# Patient Record
Sex: FEMALE | URBAN - METROPOLITAN AREA
[De-identification: names, ages, dates, MRNs, and addresses within clinical notes are randomized per-mention and may not be internally consistent; named-entity substitution may affect disease eponyms.]

---

## 2020-12-22 ENCOUNTER — OUTPATIENT (OUTPATIENT)
Dept: OUTPATIENT SERVICES | Facility: HOSPITAL | Age: 20
LOS: 1 days | End: 2020-12-22

## 2020-12-22 ENCOUNTER — TRANSCRIPTION ENCOUNTER (OUTPATIENT)
Age: 20
End: 2020-12-22

## 2020-12-22 VITALS
OXYGEN SATURATION: 98 % | HEART RATE: 95 BPM | DIASTOLIC BLOOD PRESSURE: 68 MMHG | SYSTOLIC BLOOD PRESSURE: 102 MMHG | HEIGHT: 60 IN | RESPIRATION RATE: 16 BRPM | WEIGHT: 132.94 LBS | TEMPERATURE: 99 F

## 2020-12-22 DIAGNOSIS — Z41.1 ENCOUNTER FOR COSMETIC SURGERY: ICD-10-CM

## 2020-12-22 DIAGNOSIS — K08.409 PARTIAL LOSS OF TEETH, UNSPECIFIED CAUSE, UNSPECIFIED CLASS: Chronic | ICD-10-CM

## 2020-12-22 RX ORDER — SODIUM CHLORIDE 9 MG/ML
3 INJECTION INTRAMUSCULAR; INTRAVENOUS; SUBCUTANEOUS EVERY 8 HOURS
Refills: 0 | Status: DISCONTINUED | OUTPATIENT
Start: 2020-12-29 | End: 2021-01-12

## 2020-12-22 RX ORDER — SODIUM CHLORIDE 9 MG/ML
1000 INJECTION, SOLUTION INTRAVENOUS
Refills: 0 | Status: DISCONTINUED | OUTPATIENT
Start: 2020-12-29 | End: 2021-01-12

## 2020-12-22 NOTE — H&P PST ADULT - HISTORY OF PRESENT ILLNESS
19 y/o female presents to PST preop for bilateral breast reduction on 12/29/20. preop dx of encounter for cosmetic surgery. pt reports upper back and bilateral shoulder pain as a result of breast size.

## 2020-12-22 NOTE — H&P PST ADULT - NSICDXPROBLEM_GEN_ALL_CORE_FT
PROBLEM DIAGNOSES  Problem: Encounter for cosmetic surgery  Assessment and Plan: preop for bilateral breast reduction on 12/29/20  preop instructions given, pt verbalized understanding  GI prophylaxis and chlorhexidine wash provided  pt is scheduled for COVID testing preop

## 2020-12-22 NOTE — H&P PST ADULT - NSANTHOSAYNRD_GEN_A_CORE
No. CRISTINA screening performed.  STOP BANG Legend: 0-2 = LOW Risk; 3-4 = INTERMEDIATE Risk; 5-8 = HIGH Risk

## 2020-12-26 ENCOUNTER — APPOINTMENT (OUTPATIENT)
Dept: DISASTER EMERGENCY | Facility: CLINIC | Age: 20
End: 2020-12-26

## 2020-12-26 DIAGNOSIS — Z01.818 ENCOUNTER FOR OTHER PREPROCEDURAL EXAMINATION: ICD-10-CM

## 2020-12-26 PROBLEM — Z00.00 ENCOUNTER FOR PREVENTIVE HEALTH EXAMINATION: Status: ACTIVE | Noted: 2020-12-26

## 2020-12-26 LAB — SARS-COV-2 N GENE NPH QL NAA+PROBE: NOT DETECTED

## 2020-12-29 ENCOUNTER — OUTPATIENT (OUTPATIENT)
Dept: OUTPATIENT SERVICES | Facility: HOSPITAL | Age: 20
LOS: 1 days | Discharge: HOME | End: 2020-12-29
Payer: SELF-PAY

## 2020-12-29 ENCOUNTER — RESULT REVIEW (OUTPATIENT)
Age: 20
End: 2020-12-29

## 2020-12-29 VITALS
OXYGEN SATURATION: 98 % | TEMPERATURE: 98 F | RESPIRATION RATE: 16 BRPM | HEART RATE: 110 BPM | DIASTOLIC BLOOD PRESSURE: 55 MMHG | SYSTOLIC BLOOD PRESSURE: 98 MMHG

## 2020-12-29 VITALS
SYSTOLIC BLOOD PRESSURE: 126 MMHG | OXYGEN SATURATION: 96 % | HEIGHT: 60 IN | HEART RATE: 117 BPM | RESPIRATION RATE: 16 BRPM | TEMPERATURE: 99 F | WEIGHT: 132.94 LBS | DIASTOLIC BLOOD PRESSURE: 74 MMHG

## 2020-12-29 DIAGNOSIS — Z41.1 ENCOUNTER FOR COSMETIC SURGERY: ICD-10-CM

## 2020-12-29 DIAGNOSIS — K08.409 PARTIAL LOSS OF TEETH, UNSPECIFIED CAUSE, UNSPECIFIED CLASS: Chronic | ICD-10-CM

## 2020-12-29 LAB — HCG UR QL: NEGATIVE — SIGNIFICANT CHANGE UP

## 2020-12-29 PROCEDURE — 88305 TISSUE EXAM BY PATHOLOGIST: CPT | Mod: 26

## 2020-12-29 PROCEDURE — 19318 BREAST REDUCTION: CPT | Mod: RT,LT

## 2020-12-29 RX ORDER — HYDROMORPHONE HYDROCHLORIDE 2 MG/ML
0.5 INJECTION INTRAMUSCULAR; INTRAVENOUS; SUBCUTANEOUS
Refills: 0 | Status: DISCONTINUED | OUTPATIENT
Start: 2020-12-29 | End: 2020-12-29

## 2020-12-29 RX ORDER — HYDROMORPHONE HYDROCHLORIDE 2 MG/ML
1 INJECTION INTRAMUSCULAR; INTRAVENOUS; SUBCUTANEOUS ONCE
Refills: 0 | Status: DISCONTINUED | OUTPATIENT
Start: 2020-12-29 | End: 2020-12-29

## 2020-12-29 RX ORDER — HYDROCODONE BITARTRATE AND ACETAMINOPHEN 7.5; 325 MG/15ML; MG/15ML
1 SOLUTION ORAL
Qty: 30 | Refills: 0
Start: 2020-12-29 | End: 2021-01-02

## 2020-12-29 RX ORDER — SODIUM CHLORIDE 9 MG/ML
3 INJECTION INTRAMUSCULAR; INTRAVENOUS; SUBCUTANEOUS EVERY 8 HOURS
Refills: 0 | Status: DISCONTINUED | OUTPATIENT
Start: 2020-12-29 | End: 2021-01-12

## 2020-12-29 RX ORDER — ONDANSETRON 8 MG/1
4 TABLET, FILM COATED ORAL ONCE
Refills: 0 | Status: DISCONTINUED | OUTPATIENT
Start: 2020-12-29 | End: 2021-01-12

## 2020-12-29 RX ADMIN — HYDROMORPHONE HYDROCHLORIDE 0.5 MILLIGRAM(S): 2 INJECTION INTRAMUSCULAR; INTRAVENOUS; SUBCUTANEOUS at 20:25

## 2020-12-29 RX ADMIN — HYDROMORPHONE HYDROCHLORIDE 0.5 MILLIGRAM(S): 2 INJECTION INTRAMUSCULAR; INTRAVENOUS; SUBCUTANEOUS at 20:10

## 2020-12-29 NOTE — ASU DISCHARGE PLAN (ADULT/PEDIATRIC) - FOLLOW UP APPOINTMENTS
911 or go to the nearest Emergency Room may also call Recovery Room (PACU) 24/7 @ [phone]/[facility] ASU (Adult):

## 2020-12-29 NOTE — ASU DISCHARGE PLAN (ADULT/PEDIATRIC) - CARE PROVIDER_API CALL
Zohaib Garcia  PLASTIC SURGERY  [address] [place] [address], [facility] [address]  [place] [address]  Phone: [phone]  Fax: [phone]  Follow Up Time:

## 2020-12-29 NOTE — ASU DISCHARGE PLAN (ADULT/PEDIATRIC) - CALL YOUR DOCTOR IF YOU HAVE ANY OF THE FOLLOWING:
Bleeding that does not stop/Swelling that gets worse/Pain not relieved by Medications/Fever greater than (need to indicate Fahrenheit or Celsius)/Wound/Surgical Site with redness, or foul smelling discharge or pus/Numbness, tingling, color or temperature change to extremity/Nausea and vomiting that does not stop Bleeding that does not stop/Swelling that gets worse/Pain not relieved by Medications/Fever greater than (need to indicate Fahrenheit or Celsius)/Wound/Surgical Site with redness, or foul smelling discharge or pus/Numbness, tingling, color or temperature change to extremity/Nausea and vomiting that does not stop/Unable to urinate

## 2020-12-29 NOTE — ASU DISCHARGE PLAN (ADULT/PEDIATRIC) - ASU DC SPECIAL INSTRUCTIONSFT
Dr. Zohaib Garcia Discharge instructions     1.) No heavy lifting    2.) Please take prescriptions from the office as directed     3.) Sponge bathe only , DO NOT GET BRA WET    4.) Keep bra on at all times, do not remove     5.) Follow up with Dr. Garcia next week

## 2020-12-29 NOTE — ASU DISCHARGE PLAN (ADULT/PEDIATRIC) - NURSING INSTRUCTIONS
Please report any signs and symptoms of infection including Fever (Temp >101 or >100.4 if GYN procedure), uncontrollable nausea, vomiting, diarrhea, chills & inability to urinate. Please report any puss or increased drainage from incision sites, or if redness develops and spreads around sites. Please practice good hand hygiene especially after using the bathroom. Follow up with all MD appointments and take medication(s) as prescribed   DO NOT take any Tylenol (Acetaminophen) or narcotics containing Tylenol until after 12:30AM on 12/30 . You received Tylenol during your operation and it can cause damage to your liver if too much is taken within a 24 hour time period.

## 2021-01-04 LAB — SURGICAL PATHOLOGY STUDY: SIGNIFICANT CHANGE UP

## 2023-11-14 RX ORDER — NITROFURANTOIN MACROCRYSTAL 50 MG
1 CAPSULE ORAL
Refills: 0 | DISCHARGE
Start: 2023-11-14 | End: 2023-11-20

## 2023-12-18 ENCOUNTER — TRANSCRIPTION ENCOUNTER (OUTPATIENT)
Age: 23
End: 2023-12-18

## 2023-12-18 ENCOUNTER — INPATIENT (INPATIENT)
Facility: HOSPITAL | Age: 23
LOS: 0 days | Discharge: HOME | DRG: 660 | End: 2023-12-19
Attending: INTERNAL MEDICINE | Admitting: STUDENT IN AN ORGANIZED HEALTH CARE EDUCATION/TRAINING PROGRAM
Payer: COMMERCIAL

## 2023-12-18 VITALS
SYSTOLIC BLOOD PRESSURE: 107 MMHG | HEART RATE: 89 BPM | HEIGHT: 67 IN | DIASTOLIC BLOOD PRESSURE: 64 MMHG | OXYGEN SATURATION: 100 % | WEIGHT: 119.93 LBS | TEMPERATURE: 98 F | RESPIRATION RATE: 18 BRPM

## 2023-12-18 DIAGNOSIS — K08.409 PARTIAL LOSS OF TEETH, UNSPECIFIED CAUSE, UNSPECIFIED CLASS: Chronic | ICD-10-CM

## 2023-12-18 DIAGNOSIS — N13.2 HYDRONEPHROSIS WITH RENAL AND URETERAL CALCULOUS OBSTRUCTION: ICD-10-CM

## 2023-12-18 DIAGNOSIS — N23 UNSPECIFIED RENAL COLIC: ICD-10-CM

## 2023-12-18 DIAGNOSIS — K51.00 ULCERATIVE (CHRONIC) PANCOLITIS WITHOUT COMPLICATIONS: ICD-10-CM

## 2023-12-18 PROBLEM — F41.9 ANXIETY DISORDER, UNSPECIFIED: Chronic | Status: ACTIVE | Noted: 2020-12-22

## 2023-12-18 PROBLEM — Z41.1 ENCOUNTER FOR COSMETIC SURGERY: Chronic | Status: ACTIVE | Noted: 2020-12-22

## 2023-12-18 LAB
ABO RH CONFIRMATION: SIGNIFICANT CHANGE UP
ABO RH CONFIRMATION: SIGNIFICANT CHANGE UP
ALBUMIN SERPL ELPH-MCNC: 4 G/DL — SIGNIFICANT CHANGE UP (ref 3.3–5)
ALBUMIN SERPL ELPH-MCNC: 4 G/DL — SIGNIFICANT CHANGE UP (ref 3.3–5)
ALP SERPL-CCNC: 92 U/L — SIGNIFICANT CHANGE UP (ref 40–120)
ALP SERPL-CCNC: 92 U/L — SIGNIFICANT CHANGE UP (ref 40–120)
ALT FLD-CCNC: 20 U/L — SIGNIFICANT CHANGE UP (ref 12–78)
ALT FLD-CCNC: 20 U/L — SIGNIFICANT CHANGE UP (ref 12–78)
ANION GAP SERPL CALC-SCNC: 10 MMOL/L — SIGNIFICANT CHANGE UP (ref 5–17)
ANION GAP SERPL CALC-SCNC: 10 MMOL/L — SIGNIFICANT CHANGE UP (ref 5–17)
ANION GAP SERPL CALC-SCNC: 14 MMOL/L — SIGNIFICANT CHANGE UP (ref 5–17)
ANION GAP SERPL CALC-SCNC: 14 MMOL/L — SIGNIFICANT CHANGE UP (ref 5–17)
ANION GAP SERPL CALC-SCNC: 4 MMOL/L — LOW (ref 5–17)
ANION GAP SERPL CALC-SCNC: 4 MMOL/L — LOW (ref 5–17)
APPEARANCE UR: ABNORMAL
APPEARANCE UR: ABNORMAL
AST SERPL-CCNC: 16 U/L — SIGNIFICANT CHANGE UP (ref 15–37)
AST SERPL-CCNC: 16 U/L — SIGNIFICANT CHANGE UP (ref 15–37)
BACTERIA # UR AUTO: ABNORMAL /HPF
BACTERIA # UR AUTO: ABNORMAL /HPF
BASOPHILS # BLD AUTO: 0.06 K/UL — SIGNIFICANT CHANGE UP (ref 0–0.2)
BASOPHILS # BLD AUTO: 0.06 K/UL — SIGNIFICANT CHANGE UP (ref 0–0.2)
BASOPHILS NFR BLD AUTO: 0.2 % — SIGNIFICANT CHANGE UP (ref 0–2)
BASOPHILS NFR BLD AUTO: 0.2 % — SIGNIFICANT CHANGE UP (ref 0–2)
BILIRUB SERPL-MCNC: 0.5 MG/DL — SIGNIFICANT CHANGE UP (ref 0.2–1.2)
BILIRUB SERPL-MCNC: 0.5 MG/DL — SIGNIFICANT CHANGE UP (ref 0.2–1.2)
BILIRUB UR-MCNC: NEGATIVE — SIGNIFICANT CHANGE UP
BILIRUB UR-MCNC: NEGATIVE — SIGNIFICANT CHANGE UP
BLD GP AB SCN SERPL QL: SIGNIFICANT CHANGE UP
BLD GP AB SCN SERPL QL: SIGNIFICANT CHANGE UP
BUN SERPL-MCNC: 12 MG/DL — SIGNIFICANT CHANGE UP (ref 7–23)
BUN SERPL-MCNC: 12 MG/DL — SIGNIFICANT CHANGE UP (ref 7–23)
BUN SERPL-MCNC: 15 MG/DL — SIGNIFICANT CHANGE UP (ref 7–23)
BUN SERPL-MCNC: 15 MG/DL — SIGNIFICANT CHANGE UP (ref 7–23)
BUN SERPL-MCNC: 9 MG/DL — SIGNIFICANT CHANGE UP (ref 7–23)
BUN SERPL-MCNC: 9 MG/DL — SIGNIFICANT CHANGE UP (ref 7–23)
CALCIUM SERPL-MCNC: 7.7 MG/DL — LOW (ref 8.5–10.1)
CALCIUM SERPL-MCNC: 7.7 MG/DL — LOW (ref 8.5–10.1)
CALCIUM SERPL-MCNC: 8.3 MG/DL — LOW (ref 8.5–10.1)
CALCIUM SERPL-MCNC: 8.3 MG/DL — LOW (ref 8.5–10.1)
CALCIUM SERPL-MCNC: 9.7 MG/DL — SIGNIFICANT CHANGE UP (ref 8.5–10.1)
CALCIUM SERPL-MCNC: 9.7 MG/DL — SIGNIFICANT CHANGE UP (ref 8.5–10.1)
CAST: 5 /LPF — HIGH (ref 0–4)
CAST: 5 /LPF — HIGH (ref 0–4)
CHLORIDE SERPL-SCNC: 107 MMOL/L — SIGNIFICANT CHANGE UP (ref 96–108)
CHLORIDE SERPL-SCNC: 107 MMOL/L — SIGNIFICANT CHANGE UP (ref 96–108)
CHLORIDE SERPL-SCNC: 113 MMOL/L — HIGH (ref 96–108)
CHLORIDE SERPL-SCNC: 113 MMOL/L — HIGH (ref 96–108)
CHLORIDE SERPL-SCNC: 114 MMOL/L — HIGH (ref 96–108)
CHLORIDE SERPL-SCNC: 114 MMOL/L — HIGH (ref 96–108)
CO2 SERPL-SCNC: 18 MMOL/L — LOW (ref 22–31)
CO2 SERPL-SCNC: 18 MMOL/L — LOW (ref 22–31)
CO2 SERPL-SCNC: 19 MMOL/L — LOW (ref 22–31)
CO2 SERPL-SCNC: 19 MMOL/L — LOW (ref 22–31)
CO2 SERPL-SCNC: 22 MMOL/L — SIGNIFICANT CHANGE UP (ref 22–31)
CO2 SERPL-SCNC: 22 MMOL/L — SIGNIFICANT CHANGE UP (ref 22–31)
COLOR SPEC: YELLOW — SIGNIFICANT CHANGE UP
COLOR SPEC: YELLOW — SIGNIFICANT CHANGE UP
CREAT SERPL-MCNC: 0.8 MG/DL — SIGNIFICANT CHANGE UP (ref 0.5–1.3)
CREAT SERPL-MCNC: 0.8 MG/DL — SIGNIFICANT CHANGE UP (ref 0.5–1.3)
CREAT SERPL-MCNC: 0.87 MG/DL — SIGNIFICANT CHANGE UP (ref 0.5–1.3)
CREAT SERPL-MCNC: 0.87 MG/DL — SIGNIFICANT CHANGE UP (ref 0.5–1.3)
CREAT SERPL-MCNC: 0.92 MG/DL — SIGNIFICANT CHANGE UP (ref 0.5–1.3)
CREAT SERPL-MCNC: 0.92 MG/DL — SIGNIFICANT CHANGE UP (ref 0.5–1.3)
D DIMER BLD IA.RAPID-MCNC: <150 NG/ML DDU — SIGNIFICANT CHANGE UP
D DIMER BLD IA.RAPID-MCNC: <150 NG/ML DDU — SIGNIFICANT CHANGE UP
DIFF PNL FLD: ABNORMAL
DIFF PNL FLD: ABNORMAL
EGFR: 106 ML/MIN/1.73M2 — SIGNIFICANT CHANGE UP
EGFR: 106 ML/MIN/1.73M2 — SIGNIFICANT CHANGE UP
EGFR: 90 ML/MIN/1.73M2 — SIGNIFICANT CHANGE UP
EGFR: 90 ML/MIN/1.73M2 — SIGNIFICANT CHANGE UP
EGFR: 96 ML/MIN/1.73M2 — SIGNIFICANT CHANGE UP
EGFR: 96 ML/MIN/1.73M2 — SIGNIFICANT CHANGE UP
EOSINOPHIL # BLD AUTO: 0.02 K/UL — SIGNIFICANT CHANGE UP (ref 0–0.5)
EOSINOPHIL # BLD AUTO: 0.02 K/UL — SIGNIFICANT CHANGE UP (ref 0–0.5)
EOSINOPHIL NFR BLD AUTO: 0.1 % — SIGNIFICANT CHANGE UP (ref 0–6)
EOSINOPHIL NFR BLD AUTO: 0.1 % — SIGNIFICANT CHANGE UP (ref 0–6)
GLUCOSE SERPL-MCNC: 122 MG/DL — HIGH (ref 70–99)
GLUCOSE SERPL-MCNC: 122 MG/DL — HIGH (ref 70–99)
GLUCOSE SERPL-MCNC: 169 MG/DL — HIGH (ref 70–99)
GLUCOSE SERPL-MCNC: 169 MG/DL — HIGH (ref 70–99)
GLUCOSE SERPL-MCNC: 98 MG/DL — SIGNIFICANT CHANGE UP (ref 70–99)
GLUCOSE SERPL-MCNC: 98 MG/DL — SIGNIFICANT CHANGE UP (ref 70–99)
GLUCOSE UR QL: NEGATIVE MG/DL — SIGNIFICANT CHANGE UP
GLUCOSE UR QL: NEGATIVE MG/DL — SIGNIFICANT CHANGE UP
HCG SERPL-ACNC: <1 MIU/ML — SIGNIFICANT CHANGE UP
HCG SERPL-ACNC: <1 MIU/ML — SIGNIFICANT CHANGE UP
HCT VFR BLD CALC: 37.4 % — SIGNIFICANT CHANGE UP (ref 34.5–45)
HCT VFR BLD CALC: 37.4 % — SIGNIFICANT CHANGE UP (ref 34.5–45)
HGB BLD-MCNC: 13.1 G/DL — SIGNIFICANT CHANGE UP (ref 11.5–15.5)
HGB BLD-MCNC: 13.1 G/DL — SIGNIFICANT CHANGE UP (ref 11.5–15.5)
IMM GRANULOCYTES NFR BLD AUTO: 0.6 % — SIGNIFICANT CHANGE UP (ref 0–0.9)
IMM GRANULOCYTES NFR BLD AUTO: 0.6 % — SIGNIFICANT CHANGE UP (ref 0–0.9)
KETONES UR-MCNC: 80 MG/DL
KETONES UR-MCNC: 80 MG/DL
LACTATE SERPL-SCNC: 1 MMOL/L — SIGNIFICANT CHANGE UP (ref 0.7–2)
LACTATE SERPL-SCNC: 1 MMOL/L — SIGNIFICANT CHANGE UP (ref 0.7–2)
LACTATE SERPL-SCNC: 2.7 MMOL/L — HIGH (ref 0.7–2)
LACTATE SERPL-SCNC: 2.7 MMOL/L — HIGH (ref 0.7–2)
LACTATE SERPL-SCNC: 3 MMOL/L — HIGH (ref 0.7–2)
LACTATE SERPL-SCNC: 3 MMOL/L — HIGH (ref 0.7–2)
LACTATE SERPL-SCNC: 5.2 MMOL/L — CRITICAL HIGH (ref 0.7–2)
LACTATE SERPL-SCNC: 5.2 MMOL/L — CRITICAL HIGH (ref 0.7–2)
LEUKOCYTE ESTERASE UR-ACNC: ABNORMAL
LEUKOCYTE ESTERASE UR-ACNC: ABNORMAL
LIDOCAIN IGE QN: 23 U/L — SIGNIFICANT CHANGE UP (ref 13–75)
LIDOCAIN IGE QN: 23 U/L — SIGNIFICANT CHANGE UP (ref 13–75)
LYMPHOCYTES # BLD AUTO: 10.3 % — LOW (ref 13–44)
LYMPHOCYTES # BLD AUTO: 10.3 % — LOW (ref 13–44)
LYMPHOCYTES # BLD AUTO: 2.54 K/UL — SIGNIFICANT CHANGE UP (ref 1–3.3)
LYMPHOCYTES # BLD AUTO: 2.54 K/UL — SIGNIFICANT CHANGE UP (ref 1–3.3)
MANUAL SMEAR VERIFICATION: SIGNIFICANT CHANGE UP
MANUAL SMEAR VERIFICATION: SIGNIFICANT CHANGE UP
MCHC RBC-ENTMCNC: 30.8 PG — SIGNIFICANT CHANGE UP (ref 27–34)
MCHC RBC-ENTMCNC: 30.8 PG — SIGNIFICANT CHANGE UP (ref 27–34)
MCHC RBC-ENTMCNC: 35 GM/DL — SIGNIFICANT CHANGE UP (ref 32–36)
MCHC RBC-ENTMCNC: 35 GM/DL — SIGNIFICANT CHANGE UP (ref 32–36)
MCV RBC AUTO: 88 FL — SIGNIFICANT CHANGE UP (ref 80–100)
MCV RBC AUTO: 88 FL — SIGNIFICANT CHANGE UP (ref 80–100)
MONOCYTES # BLD AUTO: 1.18 K/UL — HIGH (ref 0–0.9)
MONOCYTES # BLD AUTO: 1.18 K/UL — HIGH (ref 0–0.9)
MONOCYTES NFR BLD AUTO: 4.8 % — SIGNIFICANT CHANGE UP (ref 2–14)
MONOCYTES NFR BLD AUTO: 4.8 % — SIGNIFICANT CHANGE UP (ref 2–14)
NEUTROPHILS # BLD AUTO: 20.74 K/UL — HIGH (ref 1.8–7.4)
NEUTROPHILS # BLD AUTO: 20.74 K/UL — HIGH (ref 1.8–7.4)
NEUTROPHILS NFR BLD AUTO: 84 % — HIGH (ref 43–77)
NEUTROPHILS NFR BLD AUTO: 84 % — HIGH (ref 43–77)
NITRITE UR-MCNC: NEGATIVE — SIGNIFICANT CHANGE UP
NITRITE UR-MCNC: NEGATIVE — SIGNIFICANT CHANGE UP
PH UR: 5.5 — SIGNIFICANT CHANGE UP (ref 5–8)
PH UR: 5.5 — SIGNIFICANT CHANGE UP (ref 5–8)
PLAT MORPH BLD: NORMAL — SIGNIFICANT CHANGE UP
PLAT MORPH BLD: NORMAL — SIGNIFICANT CHANGE UP
PLATELET # BLD AUTO: 345 K/UL — SIGNIFICANT CHANGE UP (ref 150–400)
PLATELET # BLD AUTO: 345 K/UL — SIGNIFICANT CHANGE UP (ref 150–400)
POTASSIUM SERPL-MCNC: 3 MMOL/L — LOW (ref 3.5–5.3)
POTASSIUM SERPL-MCNC: 3 MMOL/L — LOW (ref 3.5–5.3)
POTASSIUM SERPL-MCNC: 3.6 MMOL/L — SIGNIFICANT CHANGE UP (ref 3.5–5.3)
POTASSIUM SERPL-MCNC: 3.6 MMOL/L — SIGNIFICANT CHANGE UP (ref 3.5–5.3)
POTASSIUM SERPL-MCNC: 4 MMOL/L — SIGNIFICANT CHANGE UP (ref 3.5–5.3)
POTASSIUM SERPL-MCNC: 4 MMOL/L — SIGNIFICANT CHANGE UP (ref 3.5–5.3)
POTASSIUM SERPL-SCNC: 3 MMOL/L — LOW (ref 3.5–5.3)
POTASSIUM SERPL-SCNC: 3 MMOL/L — LOW (ref 3.5–5.3)
POTASSIUM SERPL-SCNC: 3.6 MMOL/L — SIGNIFICANT CHANGE UP (ref 3.5–5.3)
POTASSIUM SERPL-SCNC: 3.6 MMOL/L — SIGNIFICANT CHANGE UP (ref 3.5–5.3)
POTASSIUM SERPL-SCNC: 4 MMOL/L — SIGNIFICANT CHANGE UP (ref 3.5–5.3)
POTASSIUM SERPL-SCNC: 4 MMOL/L — SIGNIFICANT CHANGE UP (ref 3.5–5.3)
PROT SERPL-MCNC: 7.9 GM/DL — SIGNIFICANT CHANGE UP (ref 6–8.3)
PROT SERPL-MCNC: 7.9 GM/DL — SIGNIFICANT CHANGE UP (ref 6–8.3)
PROT UR-MCNC: SIGNIFICANT CHANGE UP MG/DL
PROT UR-MCNC: SIGNIFICANT CHANGE UP MG/DL
RBC # BLD: 4.25 M/UL — SIGNIFICANT CHANGE UP (ref 3.8–5.2)
RBC # BLD: 4.25 M/UL — SIGNIFICANT CHANGE UP (ref 3.8–5.2)
RBC # FLD: 12.2 % — SIGNIFICANT CHANGE UP (ref 10.3–14.5)
RBC # FLD: 12.2 % — SIGNIFICANT CHANGE UP (ref 10.3–14.5)
RBC BLD AUTO: NORMAL — SIGNIFICANT CHANGE UP
RBC BLD AUTO: NORMAL — SIGNIFICANT CHANGE UP
RBC CASTS # UR COMP ASSIST: 482 /HPF — HIGH (ref 0–4)
RBC CASTS # UR COMP ASSIST: 482 /HPF — HIGH (ref 0–4)
SODIUM SERPL-SCNC: 139 MMOL/L — SIGNIFICANT CHANGE UP (ref 135–145)
SODIUM SERPL-SCNC: 139 MMOL/L — SIGNIFICANT CHANGE UP (ref 135–145)
SODIUM SERPL-SCNC: 140 MMOL/L — SIGNIFICANT CHANGE UP (ref 135–145)
SODIUM SERPL-SCNC: 140 MMOL/L — SIGNIFICANT CHANGE UP (ref 135–145)
SODIUM SERPL-SCNC: 142 MMOL/L — SIGNIFICANT CHANGE UP (ref 135–145)
SODIUM SERPL-SCNC: 142 MMOL/L — SIGNIFICANT CHANGE UP (ref 135–145)
SP GR SPEC: 1.03 — SIGNIFICANT CHANGE UP (ref 1–1.03)
SP GR SPEC: 1.03 — SIGNIFICANT CHANGE UP (ref 1–1.03)
SQUAMOUS # UR AUTO: 6 /HPF — HIGH (ref 0–5)
SQUAMOUS # UR AUTO: 6 /HPF — HIGH (ref 0–5)
TROPONIN I, HIGH SENSITIVITY RESULT: <3 NG/L — SIGNIFICANT CHANGE UP
TROPONIN I, HIGH SENSITIVITY RESULT: <3 NG/L — SIGNIFICANT CHANGE UP
UROBILINOGEN FLD QL: 1 MG/DL — SIGNIFICANT CHANGE UP (ref 0.2–1)
UROBILINOGEN FLD QL: 1 MG/DL — SIGNIFICANT CHANGE UP (ref 0.2–1)
WBC # BLD: 24.7 K/UL — HIGH (ref 3.8–10.5)
WBC # BLD: 24.7 K/UL — HIGH (ref 3.8–10.5)
WBC # FLD AUTO: 24.7 K/UL — HIGH (ref 3.8–10.5)
WBC # FLD AUTO: 24.7 K/UL — HIGH (ref 3.8–10.5)
WBC UR QL: 8 /HPF — HIGH (ref 0–5)
WBC UR QL: 8 /HPF — HIGH (ref 0–5)

## 2023-12-18 PROCEDURE — C2617: CPT

## 2023-12-18 PROCEDURE — 99222 1ST HOSP IP/OBS MODERATE 55: CPT | Mod: 25

## 2023-12-18 PROCEDURE — 36415 COLL VENOUS BLD VENIPUNCTURE: CPT

## 2023-12-18 PROCEDURE — 85027 COMPLETE CBC AUTOMATED: CPT

## 2023-12-18 PROCEDURE — 83605 ASSAY OF LACTIC ACID: CPT

## 2023-12-18 PROCEDURE — 80048 BASIC METABOLIC PNL TOTAL CA: CPT

## 2023-12-18 PROCEDURE — 71275 CT ANGIOGRAPHY CHEST: CPT | Mod: 26,MD

## 2023-12-18 PROCEDURE — 87040 BLOOD CULTURE FOR BACTERIA: CPT

## 2023-12-18 PROCEDURE — C1769: CPT

## 2023-12-18 PROCEDURE — 76000 FLUOROSCOPY <1 HR PHYS/QHP: CPT

## 2023-12-18 PROCEDURE — 52332 CYSTOSCOPY AND TREATMENT: CPT | Mod: LT

## 2023-12-18 PROCEDURE — 99285 EMERGENCY DEPT VISIT HI MDM: CPT

## 2023-12-18 PROCEDURE — 74177 CT ABD & PELVIS W/CONTRAST: CPT | Mod: 26,MD

## 2023-12-18 PROCEDURE — 99223 1ST HOSP IP/OBS HIGH 75: CPT

## 2023-12-18 RX ORDER — ONDANSETRON 8 MG/1
4 TABLET, FILM COATED ORAL ONCE
Refills: 0 | Status: DISCONTINUED | OUTPATIENT
Start: 2023-12-18 | End: 2023-12-18

## 2023-12-18 RX ORDER — PIPERACILLIN AND TAZOBACTAM 4; .5 G/20ML; G/20ML
3.38 INJECTION, POWDER, LYOPHILIZED, FOR SOLUTION INTRAVENOUS ONCE
Refills: 0 | Status: COMPLETED | OUTPATIENT
Start: 2023-12-18 | End: 2023-12-18

## 2023-12-18 RX ORDER — SODIUM CHLORIDE 9 MG/ML
1000 INJECTION INTRAMUSCULAR; INTRAVENOUS; SUBCUTANEOUS ONCE
Refills: 0 | Status: COMPLETED | OUTPATIENT
Start: 2023-12-18 | End: 2023-12-18

## 2023-12-18 RX ORDER — OXYCODONE AND ACETAMINOPHEN 5; 325 MG/1; MG/1
1 TABLET ORAL EVERY 4 HOURS
Refills: 0 | Status: DISCONTINUED | OUTPATIENT
Start: 2023-12-18 | End: 2023-12-19

## 2023-12-18 RX ORDER — ONDANSETRON 8 MG/1
4 TABLET, FILM COATED ORAL ONCE
Refills: 0 | Status: COMPLETED | OUTPATIENT
Start: 2023-12-18 | End: 2023-12-18

## 2023-12-18 RX ORDER — SODIUM CHLORIDE 9 MG/ML
1700 INJECTION, SOLUTION INTRAVENOUS ONCE
Refills: 0 | Status: DISCONTINUED | OUTPATIENT
Start: 2023-12-18 | End: 2023-12-19

## 2023-12-18 RX ORDER — MORPHINE SULFATE 50 MG/1
2 CAPSULE, EXTENDED RELEASE ORAL ONCE
Refills: 0 | Status: DISCONTINUED | OUTPATIENT
Start: 2023-12-18 | End: 2023-12-18

## 2023-12-18 RX ORDER — PHENAZOPYRIDINE HCL 100 MG
100 TABLET ORAL EVERY 8 HOURS
Refills: 0 | Status: DISCONTINUED | OUTPATIENT
Start: 2023-12-18 | End: 2023-12-19

## 2023-12-18 RX ORDER — FENTANYL CITRATE 50 UG/ML
50 INJECTION INTRAVENOUS
Refills: 0 | Status: DISCONTINUED | OUTPATIENT
Start: 2023-12-18 | End: 2023-12-18

## 2023-12-18 RX ORDER — FENTANYL CITRATE 50 UG/ML
25 INJECTION INTRAVENOUS
Refills: 0 | Status: DISCONTINUED | OUTPATIENT
Start: 2023-12-18 | End: 2023-12-18

## 2023-12-18 RX ORDER — PIPERACILLIN AND TAZOBACTAM 4; .5 G/20ML; G/20ML
3.38 INJECTION, POWDER, LYOPHILIZED, FOR SOLUTION INTRAVENOUS EVERY 8 HOURS
Refills: 0 | Status: DISCONTINUED | OUTPATIENT
Start: 2023-12-18 | End: 2023-12-19

## 2023-12-18 RX ORDER — METRONIDAZOLE 500 MG
500 TABLET ORAL ONCE
Refills: 0 | Status: DISCONTINUED | OUTPATIENT
Start: 2023-12-18 | End: 2023-12-18

## 2023-12-18 RX ORDER — HYDROMORPHONE HYDROCHLORIDE 2 MG/ML
1 INJECTION INTRAMUSCULAR; INTRAVENOUS; SUBCUTANEOUS EVERY 4 HOURS
Refills: 0 | Status: DISCONTINUED | OUTPATIENT
Start: 2023-12-18 | End: 2023-12-19

## 2023-12-18 RX ORDER — CEFTRIAXONE 500 MG/1
1000 INJECTION, POWDER, FOR SOLUTION INTRAMUSCULAR; INTRAVENOUS ONCE
Refills: 0 | Status: DISCONTINUED | OUTPATIENT
Start: 2023-12-18 | End: 2023-12-18

## 2023-12-18 RX ORDER — CEFTRIAXONE 500 MG/1
2000 INJECTION, POWDER, FOR SOLUTION INTRAMUSCULAR; INTRAVENOUS EVERY 24 HOURS
Refills: 0 | Status: DISCONTINUED | OUTPATIENT
Start: 2023-12-18 | End: 2023-12-18

## 2023-12-18 RX ORDER — POTASSIUM CHLORIDE 20 MEQ
10 PACKET (EA) ORAL ONCE
Refills: 0 | Status: DISCONTINUED | OUTPATIENT
Start: 2023-12-18 | End: 2023-12-18

## 2023-12-18 RX ORDER — KETOROLAC TROMETHAMINE 30 MG/ML
15 SYRINGE (ML) INJECTION ONCE
Refills: 0 | Status: DISCONTINUED | OUTPATIENT
Start: 2023-12-18 | End: 2023-12-18

## 2023-12-18 RX ORDER — CEFTRIAXONE 500 MG/1
1000 INJECTION, POWDER, FOR SOLUTION INTRAMUSCULAR; INTRAVENOUS EVERY 24 HOURS
Refills: 0 | Status: DISCONTINUED | OUTPATIENT
Start: 2023-12-18 | End: 2023-12-18

## 2023-12-18 RX ORDER — ONDANSETRON 8 MG/1
4 TABLET, FILM COATED ORAL EVERY 8 HOURS
Refills: 0 | Status: DISCONTINUED | OUTPATIENT
Start: 2023-12-18 | End: 2023-12-19

## 2023-12-18 RX ORDER — PHENAZOPYRIDINE HCL 100 MG
200 TABLET ORAL ONCE
Refills: 0 | Status: COMPLETED | OUTPATIENT
Start: 2023-12-18 | End: 2023-12-18

## 2023-12-18 RX ORDER — CEFTRIAXONE 500 MG/1
1000 INJECTION, POWDER, FOR SOLUTION INTRAMUSCULAR; INTRAVENOUS ONCE
Refills: 0 | Status: COMPLETED | OUTPATIENT
Start: 2023-12-18 | End: 2023-12-18

## 2023-12-18 RX ORDER — POTASSIUM CHLORIDE 20 MEQ
10 PACKET (EA) ORAL ONCE
Refills: 0 | Status: COMPLETED | OUTPATIENT
Start: 2023-12-18 | End: 2023-12-18

## 2023-12-18 RX ORDER — SODIUM CHLORIDE 9 MG/ML
1000 INJECTION INTRAMUSCULAR; INTRAVENOUS; SUBCUTANEOUS
Refills: 0 | Status: DISCONTINUED | OUTPATIENT
Start: 2023-12-18 | End: 2023-12-19

## 2023-12-18 RX ORDER — LANOLIN ALCOHOL/MO/W.PET/CERES
3 CREAM (GRAM) TOPICAL AT BEDTIME
Refills: 0 | Status: DISCONTINUED | OUTPATIENT
Start: 2023-12-18 | End: 2023-12-19

## 2023-12-18 RX ORDER — OXYBUTYNIN CHLORIDE 5 MG
5 TABLET ORAL THREE TIMES A DAY
Refills: 0 | Status: DISCONTINUED | OUTPATIENT
Start: 2023-12-18 | End: 2023-12-19

## 2023-12-18 RX ORDER — ACETAMINOPHEN 500 MG
1000 TABLET ORAL ONCE
Refills: 0 | Status: COMPLETED | OUTPATIENT
Start: 2023-12-18 | End: 2023-12-18

## 2023-12-18 RX ORDER — SODIUM CHLORIDE 9 MG/ML
1000 INJECTION, SOLUTION INTRAVENOUS
Refills: 0 | Status: DISCONTINUED | OUTPATIENT
Start: 2023-12-18 | End: 2023-12-18

## 2023-12-18 RX ORDER — TAMSULOSIN HYDROCHLORIDE 0.4 MG/1
0.4 CAPSULE ORAL AT BEDTIME
Refills: 0 | Status: DISCONTINUED | OUTPATIENT
Start: 2023-12-18 | End: 2023-12-19

## 2023-12-18 RX ADMIN — Medication 400 MILLIGRAM(S): at 08:59

## 2023-12-18 RX ADMIN — SODIUM CHLORIDE 100 MILLILITER(S): 9 INJECTION INTRAMUSCULAR; INTRAVENOUS; SUBCUTANEOUS at 14:15

## 2023-12-18 RX ADMIN — TAMSULOSIN HYDROCHLORIDE 0.4 MILLIGRAM(S): 0.4 CAPSULE ORAL at 23:12

## 2023-12-18 RX ADMIN — SODIUM CHLORIDE 100 MILLILITER(S): 9 INJECTION INTRAMUSCULAR; INTRAVENOUS; SUBCUTANEOUS at 08:11

## 2023-12-18 RX ADMIN — MORPHINE SULFATE 2 MILLIGRAM(S): 50 CAPSULE, EXTENDED RELEASE ORAL at 05:17

## 2023-12-18 RX ADMIN — MORPHINE SULFATE 2 MILLIGRAM(S): 50 CAPSULE, EXTENDED RELEASE ORAL at 06:09

## 2023-12-18 RX ADMIN — PIPERACILLIN AND TAZOBACTAM 200 GRAM(S): 4; .5 INJECTION, POWDER, LYOPHILIZED, FOR SOLUTION INTRAVENOUS at 08:11

## 2023-12-18 RX ADMIN — SODIUM CHLORIDE 1000 MILLILITER(S): 9 INJECTION INTRAMUSCULAR; INTRAVENOUS; SUBCUTANEOUS at 05:17

## 2023-12-18 RX ADMIN — TAMSULOSIN HYDROCHLORIDE 0.4 MILLIGRAM(S): 0.4 CAPSULE ORAL at 10:49

## 2023-12-18 RX ADMIN — PIPERACILLIN AND TAZOBACTAM 25 GRAM(S): 4; .5 INJECTION, POWDER, LYOPHILIZED, FOR SOLUTION INTRAVENOUS at 14:14

## 2023-12-18 RX ADMIN — ONDANSETRON 4 MILLIGRAM(S): 8 TABLET, FILM COATED ORAL at 05:17

## 2023-12-18 RX ADMIN — MORPHINE SULFATE 2 MILLIGRAM(S): 50 CAPSULE, EXTENDED RELEASE ORAL at 08:11

## 2023-12-18 RX ADMIN — ONDANSETRON 4 MILLIGRAM(S): 8 TABLET, FILM COATED ORAL at 08:59

## 2023-12-18 RX ADMIN — Medication 200 MILLIGRAM(S): at 13:09

## 2023-12-18 RX ADMIN — Medication 100 MILLIGRAM(S): at 23:11

## 2023-12-18 RX ADMIN — PIPERACILLIN AND TAZOBACTAM 25 GRAM(S): 4; .5 INJECTION, POWDER, LYOPHILIZED, FOR SOLUTION INTRAVENOUS at 23:12

## 2023-12-18 RX ADMIN — SODIUM CHLORIDE 1000 MILLILITER(S): 9 INJECTION INTRAMUSCULAR; INTRAVENOUS; SUBCUTANEOUS at 07:06

## 2023-12-18 RX ADMIN — Medication 15 MILLIGRAM(S): at 07:02

## 2023-12-18 RX ADMIN — Medication 100 MILLIEQUIVALENT(S): at 07:00

## 2023-12-18 NOTE — H&P ADULT - NSHPLABSRESULTS_GEN_ALL_CORE
Lactate 5.2                          13.1   24.70 )-----------( 345      ( 18 Dec 2023 05:10 )             37.4   12-18    140  |  107  |  15  ----------------------------<  169<H>  3.0<L>   |  19<L>  |  0.92    Ca    9.7      18 Dec 2023 05:10    TPro  7.9  /  Alb  4.0  /  TBili  0.5  /  DBili  x   /  AST  16  /  ALT  20  /  AlkPhos  92  12-18      < from: CT Angio Chest PE Protocol w/ IV Cont (12.18.23 @ 05:38) >    IMPRESSION:    Pancolitis.    3 mm calculus in the distal left ureter associated mild left-sided   hydronephrosis. Delayed left-sided nephrogram.

## 2023-12-18 NOTE — PATIENT PROFILE ADULT - PRO INTERPRETER NEED 2
post-op plan: WBAT, PT/OT, ancef per protocol, BLE SCDs, ASA x 4 weeks post-op, f/u ortho 3 weeks English

## 2023-12-18 NOTE — CONSULT NOTE ADULT - NS ATTEND AMEND GEN_ALL_CORE FT
Patient was seen and examined by me, agree with above note, where necessary edits were made.     No prior personal h/o kidney stone. Father has h/o kidney stone.   Discussed treatment options and recommended Cystoscopy and left ureteral stent placement considering abnormal labs.   Pt and father agreeable.   Risks and benefits were discussed. Informed consent obtained.   Discussed will need definitive management of ureteral stone at later date. Discussed treatment options: ESWL Vs Ureteroscopy.   Risks and benefits of each were discussed. Recommended Ureteroscopy.   Patient agreeable. Will schedule out patient.

## 2023-12-18 NOTE — BRIEF OPERATIVE NOTE - NSICDXBRIEFPREOP_GEN_ALL_CORE_FT
PRE-OP DIAGNOSIS:  Renal colic 18-Dec-2023 13:04:43  Kilo Denton  Ureteral stone with hydronephrosis 18-Dec-2023 13:04:51  Kilo Denton

## 2023-12-18 NOTE — ED ADULT NURSE REASSESSMENT NOTE - NS ED NURSE REASSESS COMMENT FT1
pt had 1 line with potasium running. Multiple attempts for second line unsuccessful. As per previous rn md ybarra aware, waiting for ultra sound iv.. 2nd set of blood cultures and lactate sent. pt is 10/10 pain. dad at bedside.

## 2023-12-18 NOTE — ED PROVIDER NOTE - PROGRESS NOTE DETAILS
Connie MIN: Patient with CT evidence of pancolitis, kidney stone, possible UTI, high concern for infected stone, unable to tolerate PO, s/p abx, urology to follow up inpatient, spoke with Dr. Virgil Soriano, hospitalist admission appreciated. MS, BALTA.

## 2023-12-18 NOTE — ED PROVIDER NOTE - CONSTITUTIONAL, MLM
Well appearing, awake, alert, oriented to person, place, time/situation and in no apparent distress. normal... Well appearing, awake, alert, oriented to person, place, time/situation and in no apparent distress. AAOx4.

## 2023-12-18 NOTE — H&P ADULT - NSHPPHYSICALEXAM_GEN_ALL_CORE
Vital Signs Last 24 Hrs  T(C): 37.3 (18 Dec 2023 07:20), Max: 37.3 (18 Dec 2023 07:20)  T(F): 99.2 (18 Dec 2023 07:20), Max: 99.2 (18 Dec 2023 07:20)  HR: 85 (18 Dec 2023 07:20) (85 - 89)  BP: 123/81 (18 Dec 2023 07:20) (107/64 - 123/81)  BP(mean): 92 (18 Dec 2023 07:20) (92 - 92)  RR: 18 (18 Dec 2023 07:20) (18 - 18)  SpO2: 100% (18 Dec 2023 07:20) (100% - 100%)    Parameters below as of 18 Dec 2023 07:20  Patient On (Oxygen Delivery Method): room air    PHYSICAL EXAM:      Constitutional: NAD  Eyes: perrl, no conjunctival changes  ENMT: no exudates, moist oral muc, uvula midline  Neck: no JVD, no LAD  Back: no cva tenderness  Respiratory: CTA, no exp wheezes  Cardiovascular: S1S2 reg, no murmur gallop or rub  Gastrointestinal: abd soft, NT/ND + BS  Genitourinary: voiding  Extremities: FROM, no joint effusions, no edema, no clubbing , no cyanosis  Vascular: pedal pulses + bilateral, warm extremities  Neurological: non focal, mot str 5/5/ all extr  Skin: no rashes  Lymph Nodes: no LAD

## 2023-12-18 NOTE — ED PROVIDER NOTE - CLINICAL SUMMARY MEDICAL DECISION MAKING FREE TEXT BOX
abdominal pain, flank pain, failure to tolerate PO, ct, labs, r/o pyelo, stone, appy, colitis. etc. Patient with CT evidence of pancolitis, kidney stone, possible UTI, high concern for infected stone, unable to tolerate PO, s/p abx, urology to follow up inpatient, spoke with Dr. Virgil Soriano, hospitalist admission appreciated. MS, BALTA.

## 2023-12-18 NOTE — ED ADULT NURSE NOTE - NSFALLRISKINTERV_ED_ALL_ED
Assistance OOB with selected safe patient handling equipment if applicable/Assistance with ambulation/Communicate fall risk and risk factors to all staff, patient, and family/Monitor gait and stability/Provide visual cue: yellow wristband, yellow gown, etc/Reinforce activity limits and safety measures with patient and family/Call bell, personal items and telephone in reach/Instruct patient to call for assistance before getting out of bed/chair/stretcher/Non-slip footwear applied when patient is off stretcher/[place] to call system/Physically safe environment - no spills, clutter or unnecessary equipment/Purposeful Proactive Rounding/Room/bathroom lighting operational, light cord in reach Assistance OOB with selected safe patient handling equipment if applicable/Assistance with ambulation/Communicate fall risk and risk factors to all staff, patient, and family/Monitor gait and stability/Provide visual cue: yellow wristband, yellow gown, etc/Reinforce activity limits and safety measures with patient and family/Call bell, personal items and telephone in reach/Instruct patient to call for assistance before getting out of bed/chair/stretcher/Non-slip footwear applied when patient is off stretcher/[place] to call system/Physically safe environment - no spills, clutter or unnecessary equipment/Purposeful Proactive Rounding/Room/bathroom lighting operational, light cord in reach

## 2023-12-18 NOTE — PHARMACOTHERAPY INTERVENTION NOTE - COMMENTS
Medication reconciliation complete, [facility]  Medication reconciliation complete, [facility]

## 2023-12-18 NOTE — BRIEF OPERATIVE NOTE - NSICDXBRIEFPOSTOP_GEN_ALL_CORE_FT
POST-OP DIAGNOSIS:  Renal colic 18-Dec-2023 13:04:57  Kilo Denton  Ureteral stone with hydronephrosis 18-Dec-2023 13:05:00  Kilo Denton

## 2023-12-18 NOTE — H&P ADULT - ASSESSMENT
* Flank pain sec to obstructive uropathy and sepsis  NPO  repeat lactate  c/w IVF  Zosyn ( to cover urine and pancolitis)  case d/w Dad and Urology  close monitoring for worsening signs of sepsis    * Pancolitis  no diarrhea I will not assume cdiff  zosyn

## 2023-12-18 NOTE — ED PROVIDER NOTE - CARE PLAN
Principal Discharge DX:	Pancolitis  Secondary Diagnosis:	Kidney stone  Secondary Diagnosis:	Acute UTI   1

## 2023-12-18 NOTE — ED ADULT NURSE NOTE - OBJECTIVE STATEMENT
pt presents to ED c/o n/v/d and abd pain x3 hours PTA. pt denies pmhx. pt denies cp, sob, ha, dizziness. pt has not eaten since prior nights dinner and states she did not have anything out of the ordinary. pt is a&o x3, with no other complaints. dad at bedside.

## 2023-12-18 NOTE — ED PROVIDER NOTE - OBJECTIVE STATEMENT
23 year old female with PMH of anxiety, family hx of Crohn's dx presents with cc of progressively worsening flank pain, nausea, vomiting, failure to tolerate PO and diffuse abdominal pain. No fever or chills. No melena or hematochezia. Has dysuria and frequency. Pt/father suspect UTI. Patient is ambulatory, tolerating PO. No recent trauma. No visual or focal neurological complaints. Describes pain as sharp pleuritic, 10/10 in left flank and lower upper thoracic cavity.

## 2023-12-18 NOTE — H&P ADULT - HISTORY OF PRESENT ILLNESS
23 year old female with PMH of anxiety, family hx of Crohn's dx presents with cc of progressively worsening flank pain, nausea, vomiting, failure to tolerate PO and diffuse abdominal pain. No fever or chills. No melena or hematochezia. Has dysuria and frequency.    CT Angio Chest PE Protocol w/ IV Cont (12.18.23 @ 05:38) >  IMPRESSION:    Pancolitis.    3 mm calculus in the distal left ureter associated mild left-sided   hydronephrosis. Delayed left-sided nephrogram.  Adm NPO for stent placement. LActate at 0530 was 5.2 no repeat lactate one ordered   23 year old female with PMH of anxiety, family hx of Crohn's dx presents with cc of progressively worsening flank pain, nausea, vomiting, failure to tolerate PO and diffuse abdominal pain. No fever or chills. No melena or hematochezia. Has dysuria and frequency.    CT Angio Chest PE Protocol w/ IV Cont (12.18.23 @ 05:38) >  IMPRESSION:    Pancolitis.    3 mm calculus in the distal left ureter associated mild left-sided   hydronephrosis. Delayed left-sided nephrogram.  Adm NPO for stent placement. Lactate at 0530 was 5.2 no repeat lactate; one ordered

## 2023-12-18 NOTE — ED PROVIDER NOTE - DIFFERENTIAL DIAGNOSIS
Differential Diagnosis abdominal pain, flank pain, failure to tolerate PO, ct, labs, r/o pyelo, stone, appy, colitis. etc. Patient with CT evidence of pancolitis, kidney stone, possible UTI, high concern for infected stone, unable to tolerate PO, s/p abx, urology to follow up inpatient, spoke with Dr. Virgil Soriano, hospitalist admission appreciated. MS, BALTA.

## 2023-12-18 NOTE — CONSULT NOTE ADULT - SUBJECTIVE AND OBJECTIVE BOX
HPI:  23 year old female with PMH of anxiety, family hx of Crohn's dx presents with cc of progressively worsening flank pain, nausea, vomiting, failure to tolerate PO and diffuse abdominal pain. No fever or chills. No melena or hematochezia. Has dysuria and frequency.    CT Angio Chest PE Protocol w/ IV Cont (12.18.23 @ 05:38) >  IMPRESSION:    Pancolitis.    3 mm calculus in the distal left ureter associated mild left-sided   hydronephrosis. Delayed left-sided nephrogram.  Adm NPO for stent placement. Lactate at 0530 was 5.2 no repeat lactate; one ordered   (18 Dec 2023 08:04)    24 yo female with PMH as above admitted with left flank pain found to have obstructing 3 mm left ureteral stone with WBC of 24 and Lactate of 5. Pt seen at bedside with family at bedside. Patient reports she developed sudden left flank pain at 2 am intermittent 10/10 at it's worst with nausea, NBNB vomiting and diarrhea. Patient denies any fevers, chills, dysuria, hematuria, urinary frequency or urgency.   PAST MEDICAL & SURGICAL HISTORY:  Encounter for cosmetic surgery      Anxiety      H/O wisdom tooth extraction  july 2020          REVIEW OF SYSTEMS  All other review of systems neg, except as noted in HPI    MEDICATIONS  (STANDING):  lactated ringers Bolus 1700 milliLiter(s) IV Bolus once  piperacillin/tazobactam IVPB.- 3.375 Gram(s) IV Intermittent once  piperacillin/tazobactam IVPB.. 3.375 Gram(s) IV Intermittent every 8 hours  sodium chloride 0.9%. 1000 milliLiter(s) (100 mL/Hr) IV Continuous <Continuous>  tamsulosin 0.4 milliGRAM(s) Oral at bedtime    MEDICATIONS  (PRN):  aluminum hydroxide/magnesium hydroxide/simethicone Suspension 30 milliLiter(s) Oral every 4 hours PRN Dyspepsia  HYDROmorphone  Injectable 1 milliGRAM(s) IV Push every 4 hours PRN Severe Pain (7 - 10)  melatonin 3 milliGRAM(s) Oral at bedtime PRN Insomnia  ondansetron Injectable 4 milliGRAM(s) IV Push every 8 hours PRN Nausea and/or Vomiting  oxycodone    5 mG/acetaminophen 325 mG 1 Tablet(s) Oral every 4 hours PRN Moderate Pain (4 - 6)      Allergies    No Known Allergies    Intolerances        SOCIAL HISTORY:    FAMILY HISTORY:  FH: diabetes mellitus  grandfahter        Vital Signs Last 24 Hrs  T(C): 37 (18 Dec 2023 08:15), Max: 37.3 (18 Dec 2023 07:20)  T(F): 98.6 (18 Dec 2023 08:15), Max: 99.2 (18 Dec 2023 07:20)  HR: 110 (18 Dec 2023 08:15) (85 - 110)  BP: 119/74 (18 Dec 2023 08:15) (107/64 - 123/81)  BP(mean): 89 (18 Dec 2023 08:15) (89 - 92)  RR: 16 (18 Dec 2023 08:15) (16 - 18)  SpO2: 100% (18 Dec 2023 08:15) (100% - 100%)    Parameters below as of 18 Dec 2023 08:15  Patient On (Oxygen Delivery Method): room air        PHYSICAL EXAM:    General: No distress, No anxiety  VITALS  T(C): 37 (12-18-23 @ 08:15), Max: 37.3 (12-18-23 @ 07:20)  HR: 110 (12-18-23 @ 08:15) (85 - 110)  BP: 119/74 (12-18-23 @ 08:15) (107/64 - 123/81)  RR: 16 (12-18-23 @ 08:15) (16 - 18)  SpO2: 100% (12-18-23 @ 08:15) (100% - 100%)             HEENT: Normocephalic, no icterus , EOM full , No epistaxis  Lung    : No resp distress  Abdo:   : Soft, Non tender, No guarding, No distension   Back    : No CVAT on the right, Left flank tenderness to palpation  Genitalia Female: No De La Cruz  Neuro   : A&Ox3        LABS:                        13.1   24.70 )-----------( 345      ( 18 Dec 2023 05:10 )             37.4     12-18    142  |  114<H>  |  12  ----------------------------<  122<H>  3.6   |  18<L>  |  0.87    Ca    7.7<L>      18 Dec 2023 08:49    TPro  7.9  /  Alb  4.0  /  TBili  0.5  /  DBili  x   /  AST  16  /  ALT  20  /  AlkPhos  92  12-18      Urinalysis Basic - ( 18 Dec 2023 08:49 )    Color: x / Appearance: x / SG: x / pH: x  Gluc: 122 mg/dL / Ketone: x  / Bili: x / Urobili: x   Blood: x / Protein: x / Nitrite: x   Leuk Esterase: x / RBC: x / WBC x   Sq Epi: x / Non Sq Epi: x / Bacteria: x        RADIOLOGY & ADDITIONAL STUDIES:< from: CT Abdomen and Pelvis w/ IV Cont (12.18.23 @ 05:38) >    ACC: [id number] EXAM:  CT ABDOMEN AND PELVIS IC   ORDERED BY: RUSTY MENDES     ACC: [id number] EXAM:  CT ANGIO CHEST PULM ART WAWIC   ORDERED BY: RUSTY MENDES     PROCEDURE DATE:  12/18/2023          INTERPRETATION:  CLINICAL INFORMATION: flankpain, abdominal pain    TECHNIQUE: CT imaging of the chest, abdomen, and pelvis was obtained with   IV contrast. 90 cc of Omnipaque 350 was administered. MIP images were   also obtained.    COMPARISON: There are no prior studies available for comparison.    FINDINGS:    Chest:    The thyroid is normal.    There is no supraclavicular, axillary, mediastinal or hilar   lymphadenopathy.    The heart is normal in size. There is no pericardial effusion.    There are no pulmonary nodules or focal consolidations. No pleural   effusions are present. The tracheobronchial tree is patent.    Abdomen/Pelvis:    The liver, spleen, pancreas, gallbladder and biliary tree are   unremarkable.    3 mm calculus in the distal left ureter associated mild left-sided   hydronephrosis. Delayed left-sided nephrogram. Punctate nonobstructing   left-sided intrarenal calculus. There is no right-sided hydronephrosis.   The adrenal glands are unremarkable.    Wall thickening of the ascending, transverse, descending andsigmoid   colon consistent with a pancolitis. There is no bowel obstruction. The   appendix is normal. Trace free fluid in the pelvis.    The urinary bladder is not well-distended, limiting evaluation.    There is no abdominal or pelvic lymphadenopathy.    The aorta and IVC are unremarkable.    The visualized osseous structures are within normal limits.    IMPRESSION:    Pancolitis.    3 mm calculus in the distal left ureter associated mild left-sided   hydronephrosis. Delayed left-sided nephrogram.    --- End of Report ---            MAIA YAN MD; Attending Radiologist  This document has been electronically signed. Dec 18 2023  6:05AM    < end of copied text >   HPI:  23 year old female with PMH of anxiety, family hx of Crohn's dx presents with cc of progressively worsening flank pain, nausea, vomiting, failure to tolerate PO and diffuse abdominal pain. No fever or chills. No melena or hematochezia. Has dysuria and frequency.    CT Angio Chest PE Protocol w/ IV Cont (12.18.23 @ 05:38) >  IMPRESSION:    Pancolitis.    3 mm calculus in the distal left ureter associated mild left-sided   hydronephrosis. Delayed left-sided nephrogram.  Adm NPO for stent placement. Lactate at 0530 was 5.2 no repeat lactate; one ordered   (18 Dec 2023 08:04)    24 yo female with PMH as above admitted with left flank pain found to have obstructing 3 mm left ureteral stone with WBC of 24 and Lactate of 5. Pt seen at bedside with family at bedside. Patient reports she developed sudden left flank pain at 2 am intermittent 10/10 at it's worst with nausea, NBNB vomiting and diarrhea. Patient denies any fevers, chills, dysuria, hematuria, urinary frequency or urgency.   PAST MEDICAL & SURGICAL HISTORY:  Encounter for cosmetic surgery      Anxiety      H/O wisdom tooth extraction  july 2020          REVIEW OF SYSTEMS  All other review of systems neg, except as noted in HPI    MEDICATIONS  (STANDING):  lactated ringers Bolus 1700 milliLiter(s) IV Bolus once  piperacillin/tazobactam IVPB.- 3.375 Gram(s) IV Intermittent once  piperacillin/tazobactam IVPB.. 3.375 Gram(s) IV Intermittent every 8 hours  sodium chloride 0.9%. 1000 milliLiter(s) (100 mL/Hr) IV Continuous <Continuous>  tamsulosin 0.4 milliGRAM(s) Oral at bedtime    MEDICATIONS  (PRN):  aluminum hydroxide/magnesium hydroxide/simethicone Suspension 30 milliLiter(s) Oral every 4 hours PRN Dyspepsia  HYDROmorphone  Injectable 1 milliGRAM(s) IV Push every 4 hours PRN Severe Pain (7 - 10)  melatonin 3 milliGRAM(s) Oral at bedtime PRN Insomnia  ondansetron Injectable 4 milliGRAM(s) IV Push every 8 hours PRN Nausea and/or Vomiting  oxycodone    5 mG/acetaminophen 325 mG 1 Tablet(s) Oral every 4 hours PRN Moderate Pain (4 - 6)      Allergies    No Known Allergies    Intolerances        SOCIAL HISTORY:    FAMILY HISTORY:  FH: diabetes mellitus  grandfahter        Vital Signs Last 24 Hrs  T(C): 37 (18 Dec 2023 08:15), Max: 37.3 (18 Dec 2023 07:20)  T(F): 98.6 (18 Dec 2023 08:15), Max: 99.2 (18 Dec 2023 07:20)  HR: 110 (18 Dec 2023 08:15) (85 - 110)  BP: 119/74 (18 Dec 2023 08:15) (107/64 - 123/81)  BP(mean): 89 (18 Dec 2023 08:15) (89 - 92)  RR: 16 (18 Dec 2023 08:15) (16 - 18)  SpO2: 100% (18 Dec 2023 08:15) (100% - 100%)    Parameters below as of 18 Dec 2023 08:15  Patient On (Oxygen Delivery Method): room air        PHYSICAL EXAM:    General: No distress, No anxiety  VITALS  T(C): 37 (12-18-23 @ 08:15), Max: 37.3 (12-18-23 @ 07:20)  HR: 110 (12-18-23 @ 08:15) (85 - 110)  BP: 119/74 (12-18-23 @ 08:15) (107/64 - 123/81)  RR: 16 (12-18-23 @ 08:15) (16 - 18)  SpO2: 100% (12-18-23 @ 08:15) (100% - 100%)             HEENT: Normocephalic, no icterus , EOM full , No epistaxis  Lung    : No resp distress  Abdo:   : Soft, Non tender, No guarding, No distension   Back    : No CVAT on the right, Left flank tenderness to palpation  Genitalia Female: No De La Cruz  Neuro   : A&Ox3        LABS:                        13.1   24.70 )-----------( 345      ( 18 Dec 2023 05:10 )             37.4     12-18    142  |  114<H>  |  12  ----------------------------<  122<H>  3.6   |  18<L>  |  0.87    Ca    7.7<L>      18 Dec 2023 08:49    TPro  7.9  /  Alb  4.0  /  TBili  0.5  /  DBili  x   /  AST  16  /  ALT  20  /  AlkPhos  92  12-18      Urinalysis Basic - ( 18 Dec 2023 08:49 )    Color: x / Appearance: x / SG: x / pH: x  Gluc: 122 mg/dL / Ketone: x  / Bili: x / Urobili: x   Blood: x / Protein: x / Nitrite: x   Leuk Esterase: x / RBC: x / WBC x   Sq Epi: x / Non Sq Epi: x / Bacteria: x        RADIOLOGY & ADDITIONAL STUDIES:< from: CT Abdomen and Pelvis w/ IV Cont (12.18.23 @ 05:38) >    ACC: [id number] EXAM:  CT ABDOMEN AND PELVIS IC   ORDERED BY: RUSTY MENDES     ACC: [id number] EXAM:  CT ANGIO CHEST PULM ART WAWIC   ORDERED BY: RUSTY MENDES     PROCEDURE DATE:  12/18/2023          INTERPRETATION:  CLINICAL INFORMATION: flankpain, abdominal pain    TECHNIQUE: CT imaging of the chest, abdomen, and pelvis was obtained with   IV contrast. 90 cc of Omnipaque 350 was administered. MIP images were   also obtained.    COMPARISON: There are no prior studies available for comparison.    FINDINGS:    Chest:    The thyroid is normal.    There is no supraclavicular, axillary, mediastinal or hilar   lymphadenopathy.    The heart is normal in size. There is no pericardial effusion.    There are no pulmonary nodules or focal consolidations. No pleural   effusions are present. The tracheobronchial tree is patent.    Abdomen/Pelvis:    The liver, spleen, pancreas, gallbladder and biliary tree are   unremarkable.    3 mm calculus in the distal left ureter associated mild left-sided   hydronephrosis. Delayed left-sided nephrogram. Punctate nonobstructing   left-sided intrarenal calculus. There is no right-sided hydronephrosis.   The adrenal glands are unremarkable.    Wall thickening of the ascending, transverse, descending andsigmoid   colon consistent with a pancolitis. There is no bowel obstruction. The   appendix is normal. Trace free fluid in the pelvis.    The urinary bladder is not well-distended, limiting evaluation.    There is no abdominal or pelvic lymphadenopathy.    The aorta and IVC are unremarkable.    The visualized osseous structures are within normal limits.    IMPRESSION:    Pancolitis.    3 mm calculus in the distal left ureter associated mild left-sided   hydronephrosis. Delayed left-sided nephrogram.    --- End of Report ---            MAIA YAN MD; Attending Radiologist  This document has been electronically signed. Dec 18 2023  6:05AM    < end of copied text >

## 2023-12-18 NOTE — ED ADULT TRIAGE NOTE - CHIEF COMPLAINT QUOTE
Pt BIBEMS complaining of flank pain. Pt endorses pain in R flank radiating to abdomen. Pt denies cp, sob. Pt endorses hx of UTIs. Pt tearful in triage. LMP 1 month ago. No meds taken PTA.

## 2023-12-18 NOTE — CONSULT NOTE ADULT - ASSESSMENT
24 yo female with PMH as above admitted with left flank pain found to have obstructing 3 mm left ureteral stone with WBC of 24 and Lactate of 5.   Discussed cystoscopy, ureteral stent placement with patient, risks, benefits and alternatives which include nephrostomy tube or conservative observation/ medical expulsive therapy. Discussed given pt's labs and concern for infection with elevated WBC a ureteral stent is recommended. Patient would like to like to proceed with ureteral stent placement at this time.  Recommend  - Cystoscopy, ureteral stent placement   - NPO until procedure   - Strain urine  - Flomax  - Pain control/ IVF  - F/U cultures and sensitivities and treat accordingly  - Patient will follow up outpatient for further stone and stent management    Case discussed with Dr. Denton

## 2023-12-18 NOTE — PATIENT PROFILE ADULT - FALL HARM RISK - UNIVERSAL INTERVENTIONS
Bed in lowest position, wheels locked, appropriate side rails in place/Call bell, personal items and telephone in reach/Instruct patient to call for assistance before getting out of bed or chair/Non-slip footwear when patient is out of bed/[place] to call system/Physically safe environment - no spills, clutter or unnecessary equipment/Purposeful Proactive Rounding/Room/bathroom lighting operational, light cord in reach Bed in lowest position, wheels locked, appropriate side rails in place/Call bell, personal items and telephone in reach/Instruct patient to call for assistance before getting out of bed or chair/Non-slip footwear when patient is out of bed/[place] to call system/Physically safe environment - no spills, clutter or unnecessary equipment/Purposeful Proactive Rounding/Room/bathroom lighting operational, light cord in reach

## 2023-12-18 NOTE — ED ADULT NURSE REASSESSMENT NOTE - NS ED NURSE REASSESS COMMENT FT1
Pt alert and oriented, VSS, no complaints of pain this morning prior to stent placement, pt independent, complains of discomfort while urinating, pyridium given in PACU, straining urine, no signs of stone as of yet, pt instructed on plan of care, call bell in reach, safety and comfort maintained.

## 2023-12-18 NOTE — BRIEF OPERATIVE NOTE - NSICDXBRIEFPROCEDURE_GEN_ALL_CORE_FT
PROCEDURES:  Cystoscopy with insertion of ureteral stent in adult 18-Dec-2023 13:04:28 Left Kilo Denton

## 2023-12-19 ENCOUNTER — TRANSCRIPTION ENCOUNTER (OUTPATIENT)
Age: 23
End: 2023-12-19

## 2023-12-19 VITALS
HEART RATE: 101 BPM | DIASTOLIC BLOOD PRESSURE: 69 MMHG | SYSTOLIC BLOOD PRESSURE: 113 MMHG | RESPIRATION RATE: 18 BRPM | TEMPERATURE: 98 F | OXYGEN SATURATION: 93 %

## 2023-12-19 LAB
ANION GAP SERPL CALC-SCNC: 6 MMOL/L — SIGNIFICANT CHANGE UP (ref 5–17)
ANION GAP SERPL CALC-SCNC: 6 MMOL/L — SIGNIFICANT CHANGE UP (ref 5–17)
BUN SERPL-MCNC: 5 MG/DL — LOW (ref 7–23)
BUN SERPL-MCNC: 5 MG/DL — LOW (ref 7–23)
CALCIUM SERPL-MCNC: 8.2 MG/DL — LOW (ref 8.5–10.1)
CALCIUM SERPL-MCNC: 8.2 MG/DL — LOW (ref 8.5–10.1)
CHLORIDE SERPL-SCNC: 115 MMOL/L — HIGH (ref 96–108)
CHLORIDE SERPL-SCNC: 115 MMOL/L — HIGH (ref 96–108)
CO2 SERPL-SCNC: 22 MMOL/L — SIGNIFICANT CHANGE UP (ref 22–31)
CO2 SERPL-SCNC: 22 MMOL/L — SIGNIFICANT CHANGE UP (ref 22–31)
CREAT SERPL-MCNC: 0.56 MG/DL — SIGNIFICANT CHANGE UP (ref 0.5–1.3)
CREAT SERPL-MCNC: 0.56 MG/DL — SIGNIFICANT CHANGE UP (ref 0.5–1.3)
CULTURE RESULTS: SIGNIFICANT CHANGE UP
CULTURE RESULTS: SIGNIFICANT CHANGE UP
EGFR: 131 ML/MIN/1.73M2 — SIGNIFICANT CHANGE UP
EGFR: 131 ML/MIN/1.73M2 — SIGNIFICANT CHANGE UP
GLUCOSE SERPL-MCNC: 116 MG/DL — HIGH (ref 70–99)
GLUCOSE SERPL-MCNC: 116 MG/DL — HIGH (ref 70–99)
HCT VFR BLD CALC: 30.6 % — LOW (ref 34.5–45)
HCT VFR BLD CALC: 30.6 % — LOW (ref 34.5–45)
HGB BLD-MCNC: 10.5 G/DL — LOW (ref 11.5–15.5)
HGB BLD-MCNC: 10.5 G/DL — LOW (ref 11.5–15.5)
MCHC RBC-ENTMCNC: 31.3 PG — SIGNIFICANT CHANGE UP (ref 27–34)
MCHC RBC-ENTMCNC: 31.3 PG — SIGNIFICANT CHANGE UP (ref 27–34)
MCHC RBC-ENTMCNC: 34.3 GM/DL — SIGNIFICANT CHANGE UP (ref 32–36)
MCHC RBC-ENTMCNC: 34.3 GM/DL — SIGNIFICANT CHANGE UP (ref 32–36)
MCV RBC AUTO: 91.1 FL — SIGNIFICANT CHANGE UP (ref 80–100)
MCV RBC AUTO: 91.1 FL — SIGNIFICANT CHANGE UP (ref 80–100)
PLATELET # BLD AUTO: 250 K/UL — SIGNIFICANT CHANGE UP (ref 150–400)
PLATELET # BLD AUTO: 250 K/UL — SIGNIFICANT CHANGE UP (ref 150–400)
POTASSIUM SERPL-MCNC: 3.3 MMOL/L — LOW (ref 3.5–5.3)
POTASSIUM SERPL-MCNC: 3.3 MMOL/L — LOW (ref 3.5–5.3)
POTASSIUM SERPL-SCNC: 3.3 MMOL/L — LOW (ref 3.5–5.3)
POTASSIUM SERPL-SCNC: 3.3 MMOL/L — LOW (ref 3.5–5.3)
RBC # BLD: 3.36 M/UL — LOW (ref 3.8–5.2)
RBC # BLD: 3.36 M/UL — LOW (ref 3.8–5.2)
RBC # FLD: 12.9 % — SIGNIFICANT CHANGE UP (ref 10.3–14.5)
RBC # FLD: 12.9 % — SIGNIFICANT CHANGE UP (ref 10.3–14.5)
SODIUM SERPL-SCNC: 143 MMOL/L — SIGNIFICANT CHANGE UP (ref 135–145)
SODIUM SERPL-SCNC: 143 MMOL/L — SIGNIFICANT CHANGE UP (ref 135–145)
SPECIMEN SOURCE: SIGNIFICANT CHANGE UP
SPECIMEN SOURCE: SIGNIFICANT CHANGE UP
WBC # BLD: 13.23 K/UL — HIGH (ref 3.8–10.5)
WBC # BLD: 13.23 K/UL — HIGH (ref 3.8–10.5)
WBC # FLD AUTO: 13.23 K/UL — HIGH (ref 3.8–10.5)
WBC # FLD AUTO: 13.23 K/UL — HIGH (ref 3.8–10.5)

## 2023-12-19 PROCEDURE — 99238 HOSP IP/OBS DSCHRG MGMT 30/<: CPT

## 2023-12-19 RX ORDER — TAMSULOSIN HYDROCHLORIDE 0.4 MG/1
1 CAPSULE ORAL
Qty: 30 | Refills: 0
Start: 2023-12-19 | End: 2024-01-17

## 2023-12-19 RX ORDER — TRAMADOL HYDROCHLORIDE 50 MG/1
1 TABLET ORAL
Qty: 10 | Refills: 0
Start: 2023-12-19 | End: 2023-12-23

## 2023-12-19 RX ORDER — OXYBUTYNIN CHLORIDE 5 MG
1 TABLET ORAL
Qty: 90 | Refills: 0
Start: 2023-12-19 | End: 2024-01-17

## 2023-12-19 RX ORDER — CEFUROXIME AXETIL 250 MG
1 TABLET ORAL
Qty: 8 | Refills: 0
Start: 2023-12-19 | End: 2023-12-22

## 2023-12-19 RX ORDER — CEFUROXIME AXETIL 250 MG
1 TABLET ORAL
Qty: 10 | Refills: 0
Start: 2023-12-19 | End: 2023-12-23

## 2023-12-19 RX ORDER — ACETAMINOPHEN 500 MG
1000 TABLET ORAL ONCE
Refills: 0 | Status: COMPLETED | OUTPATIENT
Start: 2023-12-19 | End: 2023-12-19

## 2023-12-19 RX ADMIN — PIPERACILLIN AND TAZOBACTAM 25 GRAM(S): 4; .5 INJECTION, POWDER, LYOPHILIZED, FOR SOLUTION INTRAVENOUS at 06:20

## 2023-12-19 RX ADMIN — SODIUM CHLORIDE 100 MILLILITER(S): 9 INJECTION INTRAMUSCULAR; INTRAVENOUS; SUBCUTANEOUS at 00:54

## 2023-12-19 RX ADMIN — Medication 100 MILLIGRAM(S): at 06:51

## 2023-12-19 RX ADMIN — Medication 400 MILLIGRAM(S): at 00:54

## 2023-12-19 NOTE — DISCHARGE NOTE PROVIDER - NSDCCPCAREPLAN_GEN_ALL_CORE_FT
PRINCIPAL DISCHARGE DIAGNOSIS  Diagnosis: Pancolitis  Assessment and Plan of Treatment: c  Take copy of the CT to PCP you will need to see a GI doc; for the stone f/up with Dr Denton      SECONDARY DISCHARGE DIAGNOSES  Diagnosis: Kidney stone  Assessment and Plan of Treatment:     Diagnosis: Acute UTI  Assessment and Plan of Treatment:

## 2023-12-19 NOTE — DISCHARGE NOTE NURSING/CASE MANAGEMENT/SOCIAL WORK - PATIENT PORTAL LINK FT
You can access the FollowMyHealth Patient Portal offered by [facility] by registering at the following website: [email or web address]. By joining [employer]’s FollowMyHealth portal, you will also be able to view your health information using other applications (apps) compatible with our system. You can access the FollowMyHealth Patient Portal offered by [facility] by registering at the following website: [email or web address]. By joining [employer]’s FollowMyHealth portal, you will also be able to view your health information using other applications (apps) compatible with our system.

## 2023-12-19 NOTE — ED ADULT NURSE REASSESSMENT NOTE - NS ED NURSE REASSESS COMMENT FT1
pt aox3, c/o upper back pain, and mild pain upon urination, pt felt very warm to touch, tachy rectal temp 98.7- medicated with tylenol IV, NS IVF and Zosyn in progress,  Pt continues to strain urine with no result. Resting at this time with significant other at bedside.

## 2023-12-19 NOTE — DISCHARGE NOTE PROVIDER - CARE PROVIDER_API CALL
Kilo Denton  Urology  [address], [address]  [place] [address]  Phone: [phone]  Fax: [phone]  Follow Up Time: 1 week    Henri Mahan  Gastroenterology  [address], [address]  [place] [address]  Phone: [phone]  Fax: [phone]  Follow Up Time: 2 weeks   Kilo Denton  Urology  [address], [address]  [place] [address]  Phone: [phone]  Fax: [phone]  Follow Up Time: 1 week    Henri Mahan  Gastroenterology  [address], [address]  [place] [address]  Phone: [phone]  Fax: [phone]  Follow Up Time: 2 weeks

## 2023-12-19 NOTE — PROGRESS NOTE ADULT - ASSESSMENT
22 yo female with obstructing left ureteral stone, elevated wbc and lactate of 5.  Pt is POD 1 s/p cystoscopy, Left ureteral stent placement. Pt is doing well post op.   Recommend  - Discharge pt with Pyridium Q8hrs PRN dysuria, Pain control PRN, antibiotics as per c/s and Flomax 0.4 mg QHS  - Strain urine  - Dr. Denton's office to schedule pt for ureteroscopy lithotripsy as outpatient    Case discussed with Dr. Denton

## 2023-12-19 NOTE — PROGRESS NOTE ADULT - SUBJECTIVE AND OBJECTIVE BOX
Pt is POD 1 s/p cystoscopy, Left ureteral stent placement. Pt is doing well post op. Notes dysuria and some stent colic, reports it is improving compared to yesterday. Denies fevers, chills, abd/flank pain otherwise    PE  General: No distress, No anxiety  VITALS  T(C): 36.7 (12-19-23 @ 08:31), Max: 37.3 (12-18-23 @ 20:12)  HR: 101 (12-19-23 @ 08:31) (87 - 120)  BP: 113/69 (12-19-23 @ 08:31) (91/49 - 117/69)  RR: 18 (12-19-23 @ 08:31) (14 - 19)  SpO2: 93% (12-19-23 @ 08:31) (93% - 100%)               Lung    : No resp distress  Abdo:   : Soft, Non tender, No guarding, No distension   Back    : No CVAT b/l  Genitalia Female: No De La Cruz  Neuro   : A&Ox3      LABS                        10.5   13.23 )-----------( 250      ( 19 Dec 2023 06:49 )             30.6   12-19    143  |  115<H>  |  5<L>  ----------------------------<  116<H>  3.3<L>   |  22  |  0.56    Ca    8.2<L>      19 Dec 2023 06:49    TPro  7.9  /  Alb  4.0  /  TBili  0.5  /  DBili  x   /  AST  16  /  ALT  20  /  AlkPhos  92  12-18

## 2023-12-19 NOTE — DISCHARGE NOTE PROVIDER - HOSPITAL COURSE
24 yo female with obstructing left ureteral stone, elevated wbc and lactate of 5.  Pt is POD 1 s/p cystoscopy, Left ureteral stent placement. Pt is doing well post op.   Recommend  - Discharge pt with Pyridium Q8hrs PRN dysuria, Pain control PRN, antibiotics as per c/s and Flomax 0.4 mg QHS  - Strain urine  - Dr. Denton's office to schedule pt for ureteroscopy lithotripsy as outpatient    * PANCOLITIS no gi symptoms f/up as OP copy of ct scan given   22 yo female with obstructing left ureteral stone, elevated wbc and lactate of 5.  Pt is POD 1 s/p cystoscopy, Left ureteral stent placement. Pt is doing well post op.   Recommend  - Discharge pt with Pyridium Q8hrs PRN dysuria, Pain control PRN, antibiotics as per c/s and Flomax 0.4 mg QHS  - Strain urine  - Dr. Denton's office to schedule pt for ureteroscopy lithotripsy as outpatient    * PANCOLITIS no gi symptoms f/up as OP copy of ct scan given

## 2023-12-19 NOTE — DISCHARGE NOTE PROVIDER - PROVIDER TOKENS
PROVIDER:[TOKEN:[[id number]],FOLLOWUP:[1 week]],PROVIDER:[TOKEN:[[id number]],FOLLOWUP:[2 weeks]] PROVIDER:[TOKEN:[[id number]],FOLLOWUP:[1 week]],PROVIDER:[TOKEN:[[id number]],FOLLOWUP:[2 weeks]]

## 2023-12-19 NOTE — DISCHARGE NOTE NURSING/CASE MANAGEMENT/SOCIAL WORK - NSDCPEFALRISK_GEN_ALL_CORE
For information on Fall & Injury Prevention, visit: [email or web address] OR  [email or web address] OR  https://www.cdc.gov/steadi/patient.html For information on Fall & Injury Prevention, visit: [email or web address]://[email or web address]-[email or web address]-mobility OR  [email or web address]://[email or web address]/[email or web address]-[email or web address]-[email or web address]-[email or web address]-avoid-injury OR  https://www.cdc.gov/steadi/patient.html

## 2023-12-19 NOTE — DISCHARGE NOTE PROVIDER - NSDCMRMEDTOKEN_GEN_ALL_CORE_FT
folic acid 1 mg oral tablet: 1 tab(s) orally once a day  lactobacillus acidophilus oral capsule: 1 cap(s) orally once a day  Uqora Promote Capsules:    cefuroxime 250 mg oral tablet: 1 tab(s) orally 2 times a day  cefuroxime 250 mg oral tablet: 1 tab(s) orally 2 times a day  folic acid 1 mg oral tablet: 1 tab(s) orally once a day  lactobacillus acidophilus oral capsule: 1 cap(s) orally once a day  oxyBUTYnin 5 mg oral tablet: 1 tab(s) orally 3 times a day as needed for Bladder spasms  oxyBUTYnin 5 mg oral tablet: 1 tab(s) orally 3 times a day as needed for Bladder spasms  tamsulosin 0.4 mg oral capsule: 1 cap(s) orally once a day (at bedtime) stop when stone removed  tamsulosin 0.4 mg oral capsule: 1 cap(s) orally once a day (at bedtime)  Ultram 50 mg oral tablet: 1 tab(s) orally 2 times a day as needed for  moderate pain MDD: 2  Uqora Promote Capsules:

## 2023-12-21 DIAGNOSIS — N13.2 HYDRONEPHROSIS WITH RENAL AND URETERAL CALCULOUS OBSTRUCTION: ICD-10-CM

## 2023-12-21 DIAGNOSIS — F41.9 ANXIETY DISORDER, UNSPECIFIED: ICD-10-CM

## 2023-12-21 DIAGNOSIS — N39.0 URINARY TRACT INFECTION, SITE NOT SPECIFIED: ICD-10-CM

## 2023-12-21 DIAGNOSIS — K51.00 ULCERATIVE (CHRONIC) PANCOLITIS WITHOUT COMPLICATIONS: ICD-10-CM

## 2023-12-22 ENCOUNTER — APPOINTMENT (OUTPATIENT)
Dept: UROLOGY | Facility: CLINIC | Age: 23
End: 2023-12-22
Payer: COMMERCIAL

## 2023-12-22 VITALS
DIASTOLIC BLOOD PRESSURE: 68 MMHG | SYSTOLIC BLOOD PRESSURE: 113 MMHG | HEIGHT: 60 IN | BODY MASS INDEX: 25.72 KG/M2 | WEIGHT: 131 LBS

## 2023-12-22 DIAGNOSIS — R19.7 DIARRHEA, UNSPECIFIED: ICD-10-CM

## 2023-12-22 PROCEDURE — 99214 OFFICE O/P EST MOD 30 MIN: CPT

## 2023-12-23 LAB
CULTURE RESULTS: SIGNIFICANT CHANGE UP
SPECIMEN SOURCE: SIGNIFICANT CHANGE UP

## 2024-01-03 NOTE — HISTORY OF PRESENT ILLNESS
[FreeTextEntry1] : Primary care physician: Dr Minda Viramontes [facility].   23-year-old female presents for follow-up. Has some pain/discomfort from ureteral stent. Daytime frequency is 2-3 times and no nocturia. 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 Has had 4 recurrent urinary tract infections in last year or so, possibly after sexual activity.  Status post cystoscopy and left ureteral stent placement on 12/18/2023 at [facility] for 3 mm obstructing left distal ureteral stone. No prior personal history of kidney stone.  Father has history of kidney stone.

## 2024-01-03 NOTE — PHYSICAL EXAM
[Normal Appearance] : normal appearance [General Appearance - In No Acute Distress] : no acute distress [] : no respiratory distress [Normal Station and Gait] : the gait and station were normal for the patient's age [Oriented To Time, Place, And Person] : oriented to person, place, and time [de-identified] : normal peripheral circulation

## 2024-01-03 NOTE — ASSESSMENT
[FreeTextEntry1] : Reviewed records on [facility] [employer].   Ureteral stone: Discussed treatment options. Recommended ureteroscopy, laser lithotripsy, stone extraction and ureteral stent exchange.  Discussed the procedure, the risks and benefits and the postoperative course.  Patient agreeable. Will schedule for 1/5/2024.  Recurrent urinary tract infections:  Recommended: good oral hydration, timed voiding, urinate right after sex, change sanitary pads often, avoid douches, bubble baths and other feminine hygiene products.  Recommended post coital prophylaxis with Nitrofurantoin 100 mg within 30 minutes after sexual activity.   Disorder of calcium metabolism: Recommended Kidney stone prevention diet: Good oral hydration so that urine is clear to light yellow, usually 1.5 to 2 Liters of fluids, mainly water. Increasing Citrate in diet by consuming citrus fruits and juices- lisa, limes, oranges, grapefruits and berries  Less red meat Less salt Limit foods with oxalate like- dark green vegetables, rhubarb, chocolate, wheat bran, nuts, cranberries, and beans Will get Kidney stone metabolic work up: Calcium, Parathyroid Hormone, Uric acid and 24-hour urine kidney stone risk profile at later date.  Will provide letter for the school so that patient can hydrate and urinate every 2-3 hours as required.

## 2024-01-03 NOTE — LETTER BODY
[Dear  ___] : Dear  [unfilled], [Courtesy Letter:] : I had the pleasure of seeing your patient, [unfilled], in my office today. [Please see my note below.] : Please see my note below. [Sincerely,] : Sincerely, [FreeTextEntry3] : Kilo Denton MD  of Urology [facility] [employer]  [facility] Offices: [address], [address] [place] [address], [facility] [address], [place] [address]  TEL: [phone] FAX: [phone]

## 2024-01-05 ENCOUNTER — TRANSCRIPTION ENCOUNTER (OUTPATIENT)
Age: 24
End: 2024-01-05

## 2024-01-05 ENCOUNTER — RESULT REVIEW (OUTPATIENT)
Age: 24
End: 2024-01-05

## 2024-01-05 ENCOUNTER — APPOINTMENT (OUTPATIENT)
Dept: UROLOGY | Facility: HOSPITAL | Age: 24
End: 2024-01-05

## 2024-01-05 ENCOUNTER — OUTPATIENT (OUTPATIENT)
Dept: INPATIENT UNIT | Facility: HOSPITAL | Age: 24
LOS: 1 days | Discharge: HOME | End: 2024-01-05
Payer: COMMERCIAL

## 2024-01-05 VITALS
SYSTOLIC BLOOD PRESSURE: 122 MMHG | HEIGHT: 60 IN | RESPIRATION RATE: 16 BRPM | DIASTOLIC BLOOD PRESSURE: 68 MMHG | WEIGHT: 134.92 LBS | OXYGEN SATURATION: 100 % | HEART RATE: 76 BPM | TEMPERATURE: 98 F

## 2024-01-05 VITALS
TEMPERATURE: 98 F | DIASTOLIC BLOOD PRESSURE: 74 MMHG | HEART RATE: 89 BPM | OXYGEN SATURATION: 100 % | SYSTOLIC BLOOD PRESSURE: 118 MMHG | RESPIRATION RATE: 15 BRPM

## 2024-01-05 DIAGNOSIS — T83.122A DISPLACEMENT OF INDWELLING URETERAL STENT, INITIAL ENCOUNTER: Chronic | ICD-10-CM

## 2024-01-05 DIAGNOSIS — K08.409 PARTIAL LOSS OF TEETH, UNSPECIFIED CAUSE, UNSPECIFIED CLASS: Chronic | ICD-10-CM

## 2024-01-05 DIAGNOSIS — N20.1 CALCULUS OF URETER: ICD-10-CM

## 2024-01-05 LAB
APTT BLD: 32.4 SEC — SIGNIFICANT CHANGE UP (ref 24.5–35.6)
APTT BLD: 32.4 SEC — SIGNIFICANT CHANGE UP (ref 24.5–35.6)
BLD GP AB SCN SERPL QL: SIGNIFICANT CHANGE UP
BLD GP AB SCN SERPL QL: SIGNIFICANT CHANGE UP
HCG UR QL: NEGATIVE — SIGNIFICANT CHANGE UP
HCG UR QL: NEGATIVE — SIGNIFICANT CHANGE UP
INR BLD: 1 RATIO — SIGNIFICANT CHANGE UP (ref 0.85–1.18)
INR BLD: 1 RATIO — SIGNIFICANT CHANGE UP (ref 0.85–1.18)
PROTHROM AB SERPL-ACNC: 11.3 SEC — SIGNIFICANT CHANGE UP (ref 9.5–13)
PROTHROM AB SERPL-ACNC: 11.3 SEC — SIGNIFICANT CHANGE UP (ref 9.5–13)

## 2024-01-05 PROCEDURE — 52352 CYSTOURETERO W/STONE REMOVE: CPT | Mod: LT

## 2024-01-05 PROCEDURE — 86850 RBC ANTIBODY SCREEN: CPT

## 2024-01-05 PROCEDURE — 81025 URINE PREGNANCY TEST: CPT

## 2024-01-05 PROCEDURE — 74176 CT ABD & PELVIS W/O CONTRAST: CPT | Mod: 26,MA

## 2024-01-05 PROCEDURE — 88300 SURGICAL PATH GROSS: CPT | Mod: 26

## 2024-01-05 PROCEDURE — 82365 CALCULUS SPECTROSCOPY: CPT

## 2024-01-05 PROCEDURE — 85610 PROTHROMBIN TIME: CPT

## 2024-01-05 PROCEDURE — 85730 THROMBOPLASTIN TIME PARTIAL: CPT

## 2024-01-05 PROCEDURE — 36415 COLL VENOUS BLD VENIPUNCTURE: CPT

## 2024-01-05 PROCEDURE — C1769: CPT

## 2024-01-05 PROCEDURE — 74176 CT ABD & PELVIS W/O CONTRAST: CPT | Mod: MA

## 2024-01-05 PROCEDURE — 86900 BLOOD TYPING SEROLOGIC ABO: CPT

## 2024-01-05 PROCEDURE — 76000 FLUOROSCOPY <1 HR PHYS/QHP: CPT

## 2024-01-05 PROCEDURE — 88300 SURGICAL PATH GROSS: CPT

## 2024-01-05 PROCEDURE — 86901 BLOOD TYPING SEROLOGIC RH(D): CPT

## 2024-01-05 RX ORDER — ONDANSETRON 8 MG/1
4 TABLET, FILM COATED ORAL ONCE
Refills: 0 | Status: DISCONTINUED | OUTPATIENT
Start: 2024-01-05 | End: 2024-01-05

## 2024-01-05 RX ORDER — FENTANYL CITRATE 50 UG/ML
25 INJECTION INTRAVENOUS
Refills: 0 | Status: DISCONTINUED | OUTPATIENT
Start: 2024-01-05 | End: 2024-01-05

## 2024-01-05 RX ORDER — FOLIC ACID 0.8 MG
1 TABLET ORAL
Refills: 0 | DISCHARGE

## 2024-01-05 RX ORDER — KETOROLAC TROMETHAMINE 30 MG/ML
30 SYRINGE (ML) INJECTION ONCE
Refills: 0 | Status: DISCONTINUED | OUTPATIENT
Start: 2024-01-05 | End: 2024-01-05

## 2024-01-05 RX ORDER — PHENAZOPYRIDINE HCL 100 MG
200 TABLET ORAL ONCE
Refills: 0 | Status: COMPLETED | OUTPATIENT
Start: 2024-01-05 | End: 2024-01-05

## 2024-01-05 RX ORDER — SODIUM CHLORIDE 9 MG/ML
1000 INJECTION, SOLUTION INTRAVENOUS
Refills: 0 | Status: DISCONTINUED | OUTPATIENT
Start: 2024-01-05 | End: 2024-01-05

## 2024-01-05 RX ORDER — PHENAZOPYRIDINE HCL 100 MG
1 TABLET ORAL
Qty: 9 | Refills: 1
Start: 2024-01-05 | End: 2024-01-10

## 2024-01-05 RX ORDER — LACTOBACILLUS ACIDOPHILUS 100MM CELL
1 CAPSULE ORAL
Refills: 0 | DISCHARGE

## 2024-01-05 RX ORDER — FENTANYL CITRATE 50 UG/ML
50 INJECTION INTRAVENOUS
Refills: 0 | Status: DISCONTINUED | OUTPATIENT
Start: 2024-01-05 | End: 2024-01-05

## 2024-01-05 RX ADMIN — Medication 30 MILLIGRAM(S): at 13:41

## 2024-01-05 RX ADMIN — Medication 200 MILLIGRAM(S): at 12:41

## 2024-01-05 RX ADMIN — Medication 30 MILLIGRAM(S): at 13:38

## 2024-01-05 NOTE — BRIEF OPERATIVE NOTE - NSICDXBRIEFPREOP_GEN_ALL_CORE_FT
PRE-OP DIAGNOSIS:  Left ureteral stone 05-Jan-2024 12:44:06  Kilo Denton  Kidney stone on left side 05-Jan-2024 12:44:14  Kilo Denton

## 2024-01-05 NOTE — ASU PATIENT PROFILE, ADULT - ALCOHOL USE HISTORY SINGLE SELECT
The script for Bactrim was never received by [facility] on [address] & [address] please re-submit. never

## 2024-01-05 NOTE — ASU DISCHARGE PLAN (ADULT/PEDIATRIC) - RETURN TO WORK/SCHOOL
Chief complaint:   Chief Complaint   Patient presents with   • Medication Management   • Forms Completion            Vitals:  Visit Vitals  /70   Pulse 74   Ht 5' 2\" (1.575 m)   Wt 79.8 kg (176 lb)   BMI 32.19 kg/m²     HISTORY OF PRESENT ILLNESS     HPI 72 year old female here for 10 month follow up for medication management.     No f/c, no HENT sx. no heart or lung problems. No urinary sx. No easy bruising. No slurred speech, weakness, dropping items, gait imbalance, visual changes.     She continues w/ coughing w/ h/o GERD, no significant help w/ several PPIs, she is having choking sensation. She has resumed Omeprazole w/ some help of sx. She has seen both Demetrius Valdez and now, Dr. Kiglore. S/p EGD. I encouraged pt to complete further studies for GERD    Pt has chronic neck pain. PDMP was reviewed; no issues of abuse noted. Pt agrees to PM service to determine need to continue w/ Oxycodone; she has appt w/ Dr. Levine on 10/24/2022. She is getting withdrawal sx from decreasing Oxycodone; she has itching w/ rx. She will take Benadryl for itching po prn w/ taking Oxycodone, edelmira Benadryl po in the past. She is requesting Clonidine rx for withdrawal sx, helped in the past. She continues w/ Gabapentin 600 mg po bid and 300 mg po at noon; she agrees to dose increase Gabapentin 600 mg po tid.     She is working under new budget constraints w/ only her income. She hopes to resume [employer] program and start exercising at local gym.     No GI upset, sexual SEs w/ SNRI. Moods are mostly stable w/ Effexor XR. No need for additional rx.  Ed passed away in 9/2022, w/ chronic pain and dementia, urine and stool incontinence; I expressed my condolences to pt. Couple was  for 54 years. She is crying at times; NEFTALY + mets CA.     My supervising physician is Dr. Nory Forte.    Her most recent labs are as follows:    Glucose (mg/dL)   Date Value   07/12/2021 93     No results found for: PSA    No results found  for: HGBA1C  Lab Results   Component Value Date    CHOLESTEROL 197 07/12/2021    HDL 54 07/12/2021    CALCLDL 115 07/12/2021    TRIGLYCERIDE 138 07/12/2021     Lab Results   Component Value Date    CREATININE 0.79 07/12/2021     Lab Results   Component Value Date    TSH 2.100 07/12/2021     No results for input(s): AST, GPT, ALBUMIN, TOTPROTEIN in the last 8765 hours.  Lab Results   Component Value Date    GFRNA 82 06/13/2019    GFRA >90 06/13/2019    GFRESTIMATE 76 (L) 07/12/2021       ASSESSMENT:  1. Cervical neuropathy    2. Major depressive disorder, recurrent episode, mild (CMS/HCC)    3. Opioid withdrawal (CMS/HCC)    4. Encounter for screening mammogram for malignant neoplasm of breast    5. Neck pain    6. History of laminectomy    7. Other chronic pain    8. Fibromyalgia    9. Need for vaccination    10. Screening for diabetes mellitus (DM)    11. Screening, ischemic heart disease    12. Screening for thyroid disorder      PLAN:  Orders Placed This Encounter   • Mammo Screening Bilateral   • XR Cervical Spine 2-3 Views   • INFLUENZA QUADRIVALENT HIGH DOSE PRES FREE 0.7 ML VACC,IM   • Comprehensive Metabolic Panel   • Lipid Panel Without Reflex   • Thyroid Stimulating Hormone   • cloNIDine (CATAPRES) 0.1 MG tablet     COVID, high dose flu vaccines today  Return in about 6 months (around 4/11/2023) for OV 40 minutes, medication management, medicare wellness exam.  [employer] disability parking form completed for pt, permanent  Continue to push water intake  Keep all other appt as scheduled  Continue all other rx as dir  Call back for refills.  Rachele my advocate supriya germán on cell phone, laptop or desktop or tablet  Xray today; pt can go home after xray is done. Schedule mammogram asap for 2022. Schedule lab appt, fasting, a few days prior to next office visit w/ me in 6 months.     Other significant problems:  Patient Active Problem List    Diagnosis Date Noted   • Obesity (BMI 30-39.9)      Priority: Low   •  Arthritis      Priority: Low     fingers, elbows     • Colon polyp      Priority: Low   • Loss of sense of smell 12/15/2015     Priority: Low   • Acid reflux disease 01/01/2015     Priority: Low     EGD 2013, and colonoscopy 2013 [facility]     • Irritable bowel syndrome (IBS) 01/01/2010     Priority: Low   • Osteoarthritis involving multiple joints on both sides of body 11/01/2000     Priority: Low   • Fibromyalgia affecting multiple sites 11/01/1979     Priority: Low     Dr. Allison Roldan, [place], [facility] [place]     • Pain syndrome, chronic 11/01/1979     Priority: Low       PAST MEDICAL, FAMILY AND SOCIAL HISTORY     Medications:  Current Outpatient Medications   Medication Sig Dispense Refill   • cloNIDine (CATAPRES) 0.1 MG tablet Take 1 tablet by mouth in the morning and 1 tablet in the evening. 60 tablet 5   • oxyCODONE, IMM REL, (ROXICODONE) 5 MG immediate release tablet Take 1 tablet by mouth every 6 hours as needed for Pain. 120 tablet 0   • venlafaxine XR (EFFEXOR XR) 75 MG 24 hr capsule TAKE 3 CAPSULES BY MOUTH DAILY 270 capsule 0   • gabapentin (NEURONTIN) 300 MG capsule Do not start before August 26, 2022. TAKE 2 CAPSULES BY MOUTH TWICE DAILY AND 1 CAPSULE AT NOON 150 capsule 2   • omeprazole (PriLOSEC) 40 MG capsule Take 1 capsule by mouth 2 times daily (before meals). 180 capsule 1   • Multiple Vitamin (MULTIVITAMIN PO)      • diclofenac (Voltaren) 1 % gel Apply 2 g topically 3 times daily for 7 days. 100 g 0   • Cyanocobalamin (B-12) 2000 MCG Tab      • vitamin E 100 units capsule Take 100 Units by mouth daily.     • Cholecalciferol (VITAMIN D3) 1000 UNITS Cap Take 1 capsule by mouth daily.       No current facility-administered medications for this visit.       Allergies:  ALLERGIES:   Allergen Reactions   • Esomeprazole PRURITUS   • Iodine Solution [Povidone Iodine] Other (See Comments)     Burning sensation of skin   • Morphine PRURITUS   • Penicillins Other  (See Comments)     Palms and feet peel        Past Medical  History/Surgeries:  Past Medical History:   Diagnosis Date   • Arthritis     fingers, elbows   • BCC (basal cell carcinoma of skin)     left neck, s/p excision   • Chicken pox    • Chronic pain     Dr. Allison Roldan, Pain Management, [facility] in [place]   • Colon polyp    • DDD (degenerative disc disease), cervical     failed back syndrome - surgery x2   • Depression    • Esophageal reflux     EGD , and colonoscopy  [facility] in [place]   • Fibromyalgia    • GERD (gastroesophageal reflux disease)    • IBS (irritable bowel syndrome)    • Obesity (BMI 30-39.9)    • Opioid dependence (CMS/HCC)    • Post-laminectomy syndrome    • Sleep apnea     no CPAP machine       Past Surgical History:   Procedure Laterality Date   • Cervical fusion  ;   • Colonoscopy diagnostic  2013   • Esophagogastroduodenoscopy transoral flex w/bx single or mult  10/13/2021    GERD, Gastritis   • Esophagoscopy w/ dilation  2012   • Laminectomy  2010    L4 and L5 with partial S1   • Removal of tonsils,12+ y/o  age 15   • Rotator cuff repair Left 2002   • Salpingoophorectomy Bilateral    • Spinal cord stimulator trial     • Total vaginal hysterectomy      done in [place], believes ovaries were removed       Family History:  Family History   Problem Relation Age of Onset   • Cancer Mother         tongue and esophagus/ smoker  age 55   • COPD Father         smoker   • Substance abuse Father         alcohol   • Vascular Father         PVD   • Alcohol Abuse Father    • High blood pressure Sister    • COPD Sister         smoker   • Sleep Apnea Brother    • Gastrointestinal Brother         GERD   • Diabetes Paternal Grandmother         type 2       Social History:  Social History     Tobacco Use   • Smoking status: Never Smoker   • Smokeless tobacco: Never Used   Substance Use Topics   • Alcohol use: Yes     Alcohol/week: 7.0  standard drinks     Types: 7 Glasses of wine per week     Comment: 1 glass of wine nightly        REVIEW OF SYSTEMS     Review of Systems As above.      PHYSICAL EXAM     Physical Exam     Constitutional: She appears well-developed and well-nourished. No distress. Pleasant, friendly demeanor. + fatigue. Well dressed. Well groomed.  HENT:   Head: Normocephalic and atraumatic. Short styled gray hair.  Eyes: Conjunctivae are normal. +glasses.  BL ears: External ear normal. No decreased hearing is noted.   Neck: Normal range of motion. Neck supple. Thyroid normal. No adenopathy. No tenderness is present. Well healed scar on anterior neck BL.    Cardiovascular: Normal rate, regular rhythm, S1 normal and S2 normal.  no BL carotid bruit.  Pulmonary/Chest: Effort normal and breath sounds normal. No respiratory distress. She has no wheezes. She has no rhonchi. She has no rales.   Lymphadenopathy: No anterior cervical adenopathy, posterior cervical adenopathy, anterior occipital adenopathy or posterior occipital adenopathy.   Neurological: She is alert. Gait normal for pt; she walks w/ cane.   Skin: She is not diaphoretic.   BL LEs: Soft. No redness, pain with palpation. No edema.  Abdomen: no CVAT BL.  Abdomen: + central obesity.  Spine: NTTP of CS-LS paravertebral ms and spinous processes.    ASSESSMENT/PLAN     As above.     Yes

## 2024-01-05 NOTE — BRIEF OPERATIVE NOTE - NSICDXBRIEFPOSTOP_GEN_ALL_CORE_FT
POST-OP DIAGNOSIS:  Left ureteral stone 05-Jan-2024 12:44:20  Kilo Denton  Kidney stone on left side 05-Jan-2024 12:44:26  Kilo Denton

## 2024-01-05 NOTE — ASU DISCHARGE PLAN (ADULT/PEDIATRIC) - PROVIDER TOKENS
FREE:[LAST:[Bertin],FIRST:[Kilo],PHONE:[(   )    -],FAX:[(   )    -],ADDRESS:[[address] [place] [address]    Tel: [phone], Option 4],FOLLOWUP:[2 months]] FREE:[LAST:[Bertin],FIRST:[Kilo],PHONE:[(   )    -],FAX:[(   )    -],ADDRESS:[[address] [place] [address]    Tel: [phone], Option 4],FOLLOWUP:[2 months]]

## 2024-01-05 NOTE — ASU DISCHARGE PLAN (ADULT/PEDIATRIC) - CARE PROVIDER_API CALL
Kilo Denton  [address] [place] [address]  [address] [place] [address]    Tel: [phone], Option 4  Phone: (   )    -  Fax: (   )    -  Follow Up Time: 2 months   Kilo Detnon  [address] [place] [address]  [address] [place] [address]    Tel: [phone], Option 4  Phone: (   )    -  Fax: (   )    -  Follow Up Time: 2 months

## 2024-01-05 NOTE — ASU DISCHARGE PLAN (ADULT/PEDIATRIC) - NS MD DC FALL RISK RISK
For information on Fall & Injury Prevention, visit: [email or web address]/[email or web address]-[email or web address]-mobility OR  [email or web address]://www.[facility]/[email or web address]-prevention-tips-to-avoid-injury OR  https://www.cdc.gov/steadi/patient.html For information on Fall & Injury Prevention, visit: [email or web address]://www.[facility]/news/fall-prevention-[email or web address]-maintains-health-and-mobility OR  https://www.[facility]/news/fall-prevention-tips-to-avoid-injury OR  https://www.cdc.gov/steadi/patient.html

## 2024-01-05 NOTE — ASU PATIENT PROFILE, ADULT - FALL HARM RISK - UNIVERSAL INTERVENTIONS
Bed in lowest position, wheels locked, appropriate side rails in place/Call bell, personal items and telephone in reach/Instruct patient to call for assistance before getting out of bed or chair/Non-slip footwear when patient is out of bed/[place] to call system/Physically safe environment - no spills, clutter or unnecessary equipment/Purposeful Proactive Rounding/Room/bathroom lighting operational, light cord in reach Bed in lowest position, wheels locked, appropriate side rails in place/Call bell, personal items and telephone in reach/Instruct patient to call for assistance before getting out of bed or chair/Non-slip footwear when patient is out of bed/[place] to call system/Physically safe environment - no spills, clutter or unnecessary equipment/Purposeful Proactive Rounding/Room/bathroom lighting operational, light cord in reach

## 2024-01-05 NOTE — PROGRESS NOTE ADULT - SUBJECTIVE AND OBJECTIVE BOX
At the time of signing consent Patient mentioned has on and off right flank pain and felt like right kidney stone.   Reviewed CT scan from 12/18/23, got IV contrast: could not say definitely of Patient had kidney stone.   Per Radiologist possibly contrast but if Patient having pain to get CT scan Stone hunt.   On CT Stone clarke: Punctate non obstructing right kidney stone with left ureteral stent in good position.   Discussed findings with Patient and her father and decided to proceed with left ureteroscopy.

## 2024-01-08 LAB
SURGICAL PATHOLOGY STUDY: SIGNIFICANT CHANGE UP
SURGICAL PATHOLOGY STUDY: SIGNIFICANT CHANGE UP

## 2024-01-09 ENCOUNTER — NON-APPOINTMENT (OUTPATIENT)
Age: 24
End: 2024-01-09

## 2024-01-09 DIAGNOSIS — F41.9 ANXIETY DISORDER, UNSPECIFIED: ICD-10-CM

## 2024-01-09 DIAGNOSIS — N20.2 CALCULUS OF KIDNEY WITH CALCULUS OF URETER: ICD-10-CM

## 2024-01-11 LAB
CELL MATERIAL STONE EST-MCNT: SIGNIFICANT CHANGE UP
LABORATORY COMMENT REPORT: SIGNIFICANT CHANGE UP
NIDUS STONE QN: SIGNIFICANT CHANGE UP

## 2024-02-24 PROBLEM — N20.0 CALCULUS OF KIDNEY: Chronic | Status: ACTIVE | Noted: 2024-01-05

## 2024-03-02 ENCOUNTER — APPOINTMENT (OUTPATIENT)
Dept: RADIOLOGY | Facility: CLINIC | Age: 24
End: 2024-03-02
Payer: COMMERCIAL

## 2024-03-02 ENCOUNTER — OUTPATIENT (OUTPATIENT)
Dept: OUTPATIENT SERVICES | Facility: HOSPITAL | Age: 24
LOS: 1 days | End: 2024-03-02
Payer: COMMERCIAL

## 2024-03-02 ENCOUNTER — APPOINTMENT (OUTPATIENT)
Dept: ULTRASOUND IMAGING | Facility: CLINIC | Age: 24
End: 2024-03-02
Payer: COMMERCIAL

## 2024-03-02 DIAGNOSIS — N20.1 CALCULUS OF URETER: ICD-10-CM

## 2024-03-02 DIAGNOSIS — T83.122A DISPLACEMENT OF INDWELLING URETERAL STENT, INITIAL ENCOUNTER: Chronic | ICD-10-CM

## 2024-03-02 DIAGNOSIS — K08.409 PARTIAL LOSS OF TEETH, UNSPECIFIED CAUSE, UNSPECIFIED CLASS: Chronic | ICD-10-CM

## 2024-03-02 PROCEDURE — 76770 US EXAM ABDO BACK WALL COMP: CPT

## 2024-03-02 PROCEDURE — 74018 RADEX ABDOMEN 1 VIEW: CPT | Mod: 26

## 2024-03-02 PROCEDURE — 74018 RADEX ABDOMEN 1 VIEW: CPT

## 2024-03-02 PROCEDURE — 76770 US EXAM ABDO BACK WALL COMP: CPT | Mod: 26

## 2024-03-14 ENCOUNTER — TRANSCRIPTION ENCOUNTER (OUTPATIENT)
Age: 24
End: 2024-03-14

## 2024-03-15 ENCOUNTER — APPOINTMENT (OUTPATIENT)
Dept: UROLOGY | Facility: CLINIC | Age: 24
End: 2024-03-15

## 2024-03-28 ENCOUNTER — NON-APPOINTMENT (OUTPATIENT)
Age: 24
End: 2024-03-28

## 2024-03-29 ENCOUNTER — APPOINTMENT (OUTPATIENT)
Dept: UROLOGY | Facility: CLINIC | Age: 24
End: 2024-03-29
Payer: COMMERCIAL

## 2024-03-29 VITALS
WEIGHT: 135 LBS | RESPIRATION RATE: 16 BRPM | HEIGHT: 60 IN | SYSTOLIC BLOOD PRESSURE: 116 MMHG | BODY MASS INDEX: 26.5 KG/M2 | OXYGEN SATURATION: 97 % | DIASTOLIC BLOOD PRESSURE: 79 MMHG

## 2024-03-29 DIAGNOSIS — N20.1 CALCULUS OF URETER: ICD-10-CM

## 2024-03-29 DIAGNOSIS — E83.50 UNSPECIFIED DISORDER OF CALCIUM METABOLISM: ICD-10-CM

## 2024-03-29 PROCEDURE — 99214 OFFICE O/P EST MOD 30 MIN: CPT

## 2024-03-31 PROBLEM — E83.50 DISORDER OF CALCIUM METABOLISM: Status: ACTIVE | Noted: 2024-01-03

## 2024-03-31 PROBLEM — N20.1 LEFT URETERAL STONE: Status: ACTIVE | Noted: 2023-12-26

## 2024-03-31 NOTE — END OF VISIT
[Time Spent: ___ minutes] : I have spent [unfilled] minutes of time on the encounter. [FreeTextEntry3] : Patient was seen with Nurse Practitioner and examined by me. Agree with above note, where necessary edits were made.

## 2024-03-31 NOTE — PHYSICAL EXAM
[Normal Appearance] : normal appearance [General Appearance - In No Acute Distress] : no acute distress [Normal Station and Gait] : the gait and station were normal for the patient's age [Oriented To Time, Place, And Person] : oriented to person, place, and time [Edema] : no peripheral edema [Well Groomed] : well groomed [Respiration, Rhythm And Depth] : normal respiratory rhythm and effort [Exaggerated Use Of Accessory Muscles For Inspiration] : no accessory muscle use [Abdomen Soft] : soft [Abdomen Tenderness] : non-tender [Costovertebral Angle Tenderness] : no ~M costovertebral angle tenderness [Urinary Bladder Findings] : the bladder was normal on palpation [] : no rash [Affect] : the affect was normal [No Focal Deficits] : no focal deficits [No Palpable Adenopathy] : no palpable adenopathy [Mood] : the mood was normal [de-identified] : normal peripheral circulation

## 2024-03-31 NOTE — HISTORY OF PRESENT ILLNESS
[FreeTextEntry1] : Primary care physician: Dr Minda Viramontes [facility].   03/29/24: 23-year-old female presents for follow up.  24-hour urine completed. Showed low citrate 189 and urine volume 1300 ml. Stone analysis showed 100% Calcium oxalate monohydrate. Recent renal and bladder ultrasound showed 4 mm left kidney stone which is not seen on X ray of Kidney, Ureter and Bladder.  Denies any difficulty with urination. Daytime frequency is 2-3 times and no nocturia. Denies dysuria, hematuria, lower abdominal or flank pain, fever, chills or rigors. No new urinary tract infection.    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 Has had 4 recurrent urinary tract infections in last year or so, possibly after sexual activity.  Status post left ureteroscopy and stone extraction (two) from the bladder, after removal of ureteral stent on 1/5/2024 at [facility].   Status post cystoscopy and left ureteral stent placement on 12/18/2023 at [facility] for 3 mm obstructing left distal ureteral stone. No prior personal history of kidney stone.  Father has history of kidney stone.

## 2024-03-31 NOTE — LETTER BODY
[Courtesy Letter:] : I had the pleasure of seeing your patient, [unfilled], in my office today. [Dear  ___] : Dear  [unfilled], [Please see my note below.] : Please see my note below. [Sincerely,] : Sincerely, [FreeTextEntry3] : Kilo Denton MD  of Urology [facility] [employer]  The [facility] Offices: [address] [facility], [address] [place] [address], [facility] [address] [facility], [place] [address]  TEL: [phone] FAX: [phone]

## 2024-03-31 NOTE — ASSESSMENT
[FreeTextEntry1] : Reviewed records. Discussed labs and imaging.   Stone analysis:100% Calcium oxalate monohydrate.  Recurrent urinary tract infections:  Recommended continue post coital prophylaxis with Nitrofurantoin 100 mg within 30 minutes after sexual activity.   Kidney stone: Recent renal and bladder ultrasound there is mention of a 4 mm left kidney stone which is not seen on X ray of Kidney, Ureter and Bladder. Stone unlikely to be present. Discussed getting CT scan if interested in intervention.  Will like to observe.  Will get Renal Bladder Ultrasound and X ray of Kidney, Ureter and Bladder before follow up appointment.   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   Will repeat 24-hour urine before follow up appointment.   Return to office in 6 months or sooner if any issues.

## 2024-04-03 ENCOUNTER — NON-APPOINTMENT (OUTPATIENT)
Age: 24
End: 2024-04-03

## 2024-04-30 DIAGNOSIS — N20.0 CALCULUS OF KIDNEY: ICD-10-CM

## 2024-05-08 LAB
APPEARANCE: CLEAR
BACTERIA UR CULT: NORMAL
BACTERIA: NEGATIVE /HPF
BILIRUBIN URINE: NEGATIVE
BLOOD URINE: ABNORMAL
CAST: 1 /LPF
COLOR: YELLOW
EPITHELIAL CELLS: 1 /HPF
GLUCOSE QUALITATIVE U: NEGATIVE MG/DL
KETONES URINE: NEGATIVE MG/DL
LEUKOCYTE ESTERASE URINE: NEGATIVE
MICROSCOPIC-UA: NORMAL
MUCUS: PRESENT
NITRITE URINE: NEGATIVE
PH URINE: 6.5
PROTEIN URINE: NORMAL MG/DL
RED BLOOD CELLS URINE: 2 /HPF
REVIEW: NORMAL
SPECIFIC GRAVITY URINE: 1.03
UROBILINOGEN URINE: 0.2 MG/DL
WHITE BLOOD CELLS URINE: 0 /HPF

## 2024-05-09 ENCOUNTER — APPOINTMENT (OUTPATIENT)
Dept: RADIOLOGY | Facility: CLINIC | Age: 24
End: 2024-05-09

## 2024-05-09 ENCOUNTER — APPOINTMENT (OUTPATIENT)
Dept: ULTRASOUND IMAGING | Facility: CLINIC | Age: 24
End: 2024-05-09
Payer: COMMERCIAL

## 2024-05-09 ENCOUNTER — OUTPATIENT (OUTPATIENT)
Dept: OUTPATIENT SERVICES | Facility: HOSPITAL | Age: 24
LOS: 1 days | End: 2024-05-09
Payer: COMMERCIAL

## 2024-05-09 DIAGNOSIS — N20.0 CALCULUS OF KIDNEY: ICD-10-CM

## 2024-05-09 PROCEDURE — 74018 RADEX ABDOMEN 1 VIEW: CPT

## 2024-05-09 PROCEDURE — 76770 US EXAM ABDO BACK WALL COMP: CPT | Mod: 26

## 2024-05-09 PROCEDURE — 76770 US EXAM ABDO BACK WALL COMP: CPT

## 2024-05-09 PROCEDURE — 74018 RADEX ABDOMEN 1 VIEW: CPT | Mod: 26

## 2024-05-15 LAB
APPEARANCE: CLEAR
BACTERIA UR CULT: NORMAL
BACTERIA: NEGATIVE /HPF
BILIRUBIN URINE: NEGATIVE
BLOOD URINE: NEGATIVE
CAST: 0 /LPF
COLOR: YELLOW
EPITHELIAL CELLS: 1 /HPF
GLUCOSE QUALITATIVE U: NEGATIVE MG/DL
KETONES URINE: NEGATIVE MG/DL
LEUKOCYTE ESTERASE URINE: NEGATIVE
MICROSCOPIC-UA: NORMAL
NITRITE URINE: NEGATIVE
PH URINE: 6.5
PROTEIN URINE: NEGATIVE MG/DL
RED BLOOD CELLS URINE: 1 /HPF
SPECIFIC GRAVITY URINE: 1.02
UROBILINOGEN URINE: 0.2 MG/DL
WHITE BLOOD CELLS URINE: 0 /HPF

## 2024-05-23 DIAGNOSIS — N94.9 UNSPECIFIED CONDITION ASSOCIATED WITH FEMALE GENITAL ORGANS AND MENSTRUAL CYCLE: ICD-10-CM

## 2024-05-23 DIAGNOSIS — N39.0 URINARY TRACT INFECTION, SITE NOT SPECIFIED: ICD-10-CM

## 2024-05-24 LAB
APPEARANCE: CLEAR
BACTERIA: NEGATIVE /HPF
BILIRUBIN URINE: NEGATIVE
BLOOD URINE: NEGATIVE
CAST: 0 /LPF
COLOR: YELLOW
EPITHELIAL CELLS: 1 /HPF
GLUCOSE QUALITATIVE U: NEGATIVE MG/DL
KETONES URINE: NEGATIVE MG/DL
LEUKOCYTE ESTERASE URINE: NEGATIVE
MICROSCOPIC-UA: NORMAL
NITRITE URINE: NEGATIVE
PH URINE: 7.5
PROTEIN URINE: NEGATIVE MG/DL
RED BLOOD CELLS URINE: 0 /HPF
SPECIFIC GRAVITY URINE: 1.02
UROBILINOGEN URINE: 0.2 MG/DL
WHITE BLOOD CELLS URINE: 0 /HPF

## 2024-05-28 ENCOUNTER — TRANSCRIPTION ENCOUNTER (OUTPATIENT)
Age: 24
End: 2024-05-28

## 2024-05-28 LAB — BACTERIA UR CULT: NORMAL

## 2024-06-12 DIAGNOSIS — R39.9 UNSPECIFIED SYMPTOMS AND SIGNS INVOLVING THE GENITOURINARY SYSTEM: ICD-10-CM

## 2024-06-13 ENCOUNTER — TRANSCRIPTION ENCOUNTER (OUTPATIENT)
Age: 24
End: 2024-06-13

## 2024-06-13 LAB
APPEARANCE: CLEAR
BACTERIA UR CULT: NORMAL
BACTERIA: NEGATIVE /HPF
BILIRUBIN URINE: NEGATIVE
BLOOD URINE: NEGATIVE
CAST: 0 /LPF
COLOR: YELLOW
EPITHELIAL CELLS: 2 /HPF
GLUCOSE QUALITATIVE U: NEGATIVE MG/DL
KETONES URINE: NEGATIVE MG/DL
LEUKOCYTE ESTERASE URINE: NEGATIVE
MICROSCOPIC-UA: NORMAL
NITRITE URINE: NEGATIVE
PH URINE: 6.5
PROTEIN URINE: NEGATIVE MG/DL
RED BLOOD CELLS URINE: 0 /HPF
SPECIFIC GRAVITY URINE: 1.01
UROBILINOGEN URINE: 0.2 MG/DL
WHITE BLOOD CELLS URINE: 0 /HPF

## 2024-06-14 ENCOUNTER — TRANSCRIPTION ENCOUNTER (OUTPATIENT)
Age: 24
End: 2024-06-14

## 2024-06-14 RX ORDER — NITROFURANTOIN (MONOHYDRATE/MACROCRYSTALS) 25; 75 MG/1; MG/1
100 CAPSULE ORAL
Qty: 15 | Refills: 3 | Status: COMPLETED | COMMUNITY
Start: 2023-12-22 | End: 2024-06-14

## 2024-06-14 RX ORDER — TAMSULOSIN HYDROCHLORIDE 0.4 MG/1
0.4 CAPSULE ORAL
Qty: 14 | Refills: 0 | Status: COMPLETED | COMMUNITY
Start: 2024-04-27 | End: 2024-06-14

## 2024-06-14 RX ORDER — CEFUROXIME AXETIL 500 MG/1
500 TABLET ORAL
Qty: 14 | Refills: 0 | Status: COMPLETED | COMMUNITY
Start: 2024-05-23 | End: 2024-06-14

## 2024-06-14 RX ORDER — CEFUROXIME AXETIL 500 MG/1
500 TABLET ORAL
Qty: 14 | Refills: 0 | Status: COMPLETED | COMMUNITY
Start: 2024-04-27 | End: 2024-06-14

## 2024-08-26 ENCOUNTER — TRANSCRIPTION ENCOUNTER (OUTPATIENT)
Age: 24
End: 2024-08-26

## 2024-08-26 LAB
APPEARANCE: CLEAR
BACTERIA UR CULT: NORMAL
BACTERIA: NEGATIVE /HPF
BILIRUBIN URINE: NEGATIVE
BLOOD URINE: NEGATIVE
CAST: 0 /LPF
COLOR: YELLOW
EPITHELIAL CELLS: 2 /HPF
GLUCOSE QUALITATIVE U: NEGATIVE MG/DL
KETONES URINE: NEGATIVE MG/DL
LEUKOCYTE ESTERASE URINE: ABNORMAL
MICROSCOPIC-UA: NORMAL
NITRITE URINE: NEGATIVE
PH URINE: 6.5
PROTEIN URINE: NEGATIVE MG/DL
RED BLOOD CELLS URINE: 0 /HPF
SPECIFIC GRAVITY URINE: 1.01
UROBILINOGEN URINE: 0.2 MG/DL
WHITE BLOOD CELLS URINE: 4 /HPF

## 2024-09-23 ENCOUNTER — OUTPATIENT (OUTPATIENT)
Dept: OUTPATIENT SERVICES | Facility: HOSPITAL | Age: 24
LOS: 1 days | End: 2024-09-23
Payer: COMMERCIAL

## 2024-09-23 ENCOUNTER — APPOINTMENT (OUTPATIENT)
Dept: RADIOLOGY | Facility: CLINIC | Age: 24
End: 2024-09-23
Payer: COMMERCIAL

## 2024-09-23 ENCOUNTER — APPOINTMENT (OUTPATIENT)
Dept: ULTRASOUND IMAGING | Facility: CLINIC | Age: 24
End: 2024-09-23
Payer: COMMERCIAL

## 2024-09-23 DIAGNOSIS — N20.1 CALCULUS OF URETER: ICD-10-CM

## 2024-09-23 DIAGNOSIS — K08.409 PARTIAL LOSS OF TEETH, UNSPECIFIED CAUSE, UNSPECIFIED CLASS: Chronic | ICD-10-CM

## 2024-09-23 DIAGNOSIS — T83.122A DISPLACEMENT OF INDWELLING URETERAL STENT, INITIAL ENCOUNTER: Chronic | ICD-10-CM

## 2024-09-23 DIAGNOSIS — E83.50 UNSPECIFIED DISORDER OF CALCIUM METABOLISM: ICD-10-CM

## 2024-09-23 PROCEDURE — 76770 US EXAM ABDO BACK WALL COMP: CPT

## 2024-09-23 PROCEDURE — 74018 RADEX ABDOMEN 1 VIEW: CPT | Mod: 26

## 2024-09-23 PROCEDURE — 76770 US EXAM ABDO BACK WALL COMP: CPT | Mod: 26

## 2024-09-23 PROCEDURE — 74018 RADEX ABDOMEN 1 VIEW: CPT

## 2024-09-24 ENCOUNTER — LABORATORY RESULT (OUTPATIENT)
Age: 24
End: 2024-09-24

## 2024-09-24 ENCOUNTER — APPOINTMENT (OUTPATIENT)
Dept: GASTROENTEROLOGY | Facility: CLINIC | Age: 24
End: 2024-09-24
Payer: COMMERCIAL

## 2024-09-24 VITALS
DIASTOLIC BLOOD PRESSURE: 66 MMHG | WEIGHT: 135 LBS | SYSTOLIC BLOOD PRESSURE: 110 MMHG | BODY MASS INDEX: 26.5 KG/M2 | HEIGHT: 60 IN

## 2024-09-24 DIAGNOSIS — R19.7 DIARRHEA, UNSPECIFIED: ICD-10-CM

## 2024-09-24 DIAGNOSIS — K62.5 HEMORRHAGE OF ANUS AND RECTUM: ICD-10-CM

## 2024-09-24 PROCEDURE — 99204 OFFICE O/P NEW MOD 45 MIN: CPT

## 2024-09-24 RX ORDER — PANTOPRAZOLE SODIUM 20 MG/1
TABLET, DELAYED RELEASE ORAL
Refills: 0 | Status: ACTIVE | COMMUNITY

## 2024-09-24 NOTE — PHYSICAL EXAM

## 2024-09-25 LAB
ALBUMIN SERPL ELPH-MCNC: 4.5 G/DL
ALP BLD-CCNC: 102 U/L
ALT SERPL-CCNC: 9 U/L
ANION GAP SERPL CALC-SCNC: 14 MMOL/L
AST SERPL-CCNC: 14 U/L
BILIRUB SERPL-MCNC: 0.4 MG/DL
BUN SERPL-MCNC: 8 MG/DL
CALCIUM SERPL-MCNC: 10.2 MG/DL
CHLORIDE SERPL-SCNC: 101 MMOL/L
CO2 SERPL-SCNC: 24 MMOL/L
CREAT SERPL-MCNC: 0.59 MG/DL
CRP SERPL-MCNC: 3 MG/L
EGFR: 129 ML/MIN/1.73M2
ERYTHROCYTE [SEDIMENTATION RATE] IN BLOOD BY WESTERGREN METHOD: 23 MM/HR
GLUCOSE SERPL-MCNC: 102 MG/DL
HCT VFR BLD CALC: 39.4 %
HGB BLD-MCNC: 12.9 G/DL
MCHC RBC-ENTMCNC: 30.3 PG
MCHC RBC-ENTMCNC: 32.7 GM/DL
MCV RBC AUTO: 92.5 FL
PLATELET # BLD AUTO: 309 K/UL
POTASSIUM SERPL-SCNC: 4 MMOL/L
PROT SERPL-MCNC: 7.2 G/DL
RBC # BLD: 4.26 M/UL
RBC # FLD: 12.8 %
SODIUM SERPL-SCNC: 139 MMOL/L
TSH SERPL-ACNC: 1.23 UIU/ML
WBC # FLD AUTO: 10.28 K/UL

## 2024-09-27 ENCOUNTER — APPOINTMENT (OUTPATIENT)
Dept: UROLOGY | Facility: CLINIC | Age: 24
End: 2024-09-27

## 2024-09-27 PROCEDURE — 99214 OFFICE O/P EST MOD 30 MIN: CPT

## 2024-09-27 NOTE — HISTORY OF PRESENT ILLNESS
[FreeTextEntry1] : 25yo female for evaluation of diarrhea and bleeding  She was in [facility] until last PM when she developed diarrhea She had multiple episode of diarrhea and then some bleeding and mucous early this AM No BM since 6am this morning and feeling a little better overall  No unusual foods or sick contacts

## 2024-09-27 NOTE — HISTORY OF PRESENT ILLNESS
[FreeTextEntry1] : 23yo female for evaluation of diarrhea and bleeding  She was in [facility] until last PM when she developed diarrhea She had multiple episode of diarrhea and then some bleeding and mucous early this AM No BM since 6am this morning and feeling a little better overall  No unusual foods or sick contacts

## 2024-09-27 NOTE — ASSESSMENT
[FreeTextEntry1] : 23yo female with recent diarrhea, rectal bleeding  she has shown improvement likely infectious will check labs and stool studies  there is family hx of IBD in her brother but presentation seems more infectious

## 2024-09-30 NOTE — PHYSICAL EXAM
[Normal Appearance] : normal appearance [Well Groomed] : well groomed [General Appearance - In No Acute Distress] : no acute distress [Edema] : no peripheral edema [] : no respiratory distress [Respiration, Rhythm And Depth] : normal respiratory rhythm and effort [Exaggerated Use Of Accessory Muscles For Inspiration] : no accessory muscle use [Costovertebral Angle Tenderness] : no ~M costovertebral angle tenderness [Normal Station and Gait] : the gait and station were normal for the patient's age [Oriented To Time, Place, And Person] : oriented to person, place, and time [Affect] : the affect was normal [Mood] : the mood was normal [de-identified] : normal peripheral circulation

## 2024-09-30 NOTE — HISTORY OF PRESENT ILLNESS
[FreeTextEntry1] : Primary care physician: Dr Minda Viramontes [facility].   09/27/2024: 24-year-old female presents for telemedicine visit.   Since last visit, has had a few episodes where she felt that she had infection and also has had flank pain.    Patient called us and had a urine test and RBUS.    Is using Nitrofurantoin for post-coital prophylaxis and has not had another infection.    Did not do any repeat 24-hour urine test.    Patient denies any constipation.    03/29/24: 23-year-old female presents for follow up.  24-hour urine completed. Showed low citrate 189 and urine volume 1300 ml. Stone analysis showed 100% Calcium oxalate monohydrate. Recent renal and bladder ultrasound showed 4 mm left kidney stone which is not seen on X ray of Kidney, Ureter and Bladder.  Denies any difficulty with urination. Daytime frequency is 2-3 times and no nocturia. Denies dysuria, hematuria, lower abdominal or flank pain, fever, chills or rigors. No new urinary tract infection.    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 Has had 4 recurrent urinary tract infections in last year or so, possibly after sexual activity.  Status post left ureteroscopy and stone extraction (two) from the bladder, after removal of ureteral stent on 1/5/2024 at [facility].   Status post cystoscopy and left ureteral stent placement on 12/18/2023 at [facility] for 3 mm obstructing left distal ureteral stone. No prior personal history of kidney stone.  Father has history of kidney stone.

## 2024-09-30 NOTE — ADDENDUM
[FreeTextEntry1] :  Leslie ROMAN assisted in documentation on 09/30/2024  acting as a scribe for Dr. Kilo Denton.

## 2024-09-30 NOTE — ASSESSMENT
[FreeTextEntry1] : 09/27/2024: On recent RBUS and KUB, no concern for kidney or ureteral stone.     Patient's last 24-hour urine test in 03/2024 showed low volume and low citrate.    Kidney stone diet was reiterated. Recommended to have repeat 24-hour urine test.    Will continue post-coital prophylaxis.    Discussed limitation of ultrasound, x-ray, and kidney stone management.    Discussed that if she continues to have bother, the options would be to do CT scan and/or kidney stone hunt.     Recommended that patient try Metamucil to rule out stool and gas as a possible cause of back pain.     Reviewed records. Discussed labs and imaging.   Stone analysis:100% Calcium oxalate monohydrate.  Recurrent urinary tract infections:  Recommended continue post coital prophylaxis with Nitrofurantoin 100 mg within 30 minutes after sexual activity.   Kidney stone: Recent renal and bladder ultrasound there is mention of a 4 mm left kidney stone which is not seen on X ray of Kidney, Ureter and Bladder. Stone unlikely to be present. Discussed getting CT scan if interested in intervention.  Will like to observe.  Will get Renal Bladder Ultrasound and X ray of Kidney, Ureter and Bladder before follow up appointment.   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   Will repeat 24-hour urine before follow up appointment.   Return to office in 1 year or sooner if there are any issues.

## 2024-09-30 NOTE — LETTER BODY
[Dear  ___] : Dear  [unfilled], [Courtesy Letter:] : I had the pleasure of seeing your patient, [unfilled], in my office today. [Please see my note below.] : Please see my note below. [Sincerely,] : Sincerely, [FreeTextEntry3] : Kilo Denton MD  of Urology [facility] [employer] [facility] [employer]  The [facility] Offices: [address] [facility], [address] [place] [address], [facility] [address] [facility], [place] [address]  TEL: [phone] FAX: [phone]

## 2024-09-30 NOTE — LETTER BODY
[Dear  ___] : Dear  [unfilled], [Courtesy Letter:] : I had the pleasure of seeing your patient, [unfilled], in my office today. [Please see my note below.] : Please see my note below. [Sincerely,] : Sincerely, [FreeTextEntry3] : Kilo Denton MD  of Urology [facility] [employer]  The [facility] Offices: [address] [facility], [address] [place] [address], [facility] [address] [facility], [place] [address]  TEL: [phone] FAX: [phone]

## 2024-09-30 NOTE — PHYSICAL EXAM
[Normal Appearance] : normal appearance [Well Groomed] : well groomed [General Appearance - In No Acute Distress] : no acute distress [Edema] : no peripheral edema [] : no respiratory distress [Respiration, Rhythm And Depth] : normal respiratory rhythm and effort [Exaggerated Use Of Accessory Muscles For Inspiration] : no accessory muscle use [Costovertebral Angle Tenderness] : no ~M costovertebral angle tenderness [Normal Station and Gait] : the gait and station were normal for the patient's age [Oriented To Time, Place, And Person] : oriented to person, place, and time [Affect] : the affect was normal [Mood] : the mood was normal [de-identified] : normal peripheral circulation

## 2024-09-30 NOTE — LETTER BODY
[Dear  ___] : Dear  [unfilled], [Courtesy Letter:] : I had the pleasure of seeing your patient, [unfilled], in my office today. [Please see my note below.] : Please see my note below. [Sincerely,] : Sincerely, [FreeTextEntry3] : Kilo Denton MD  of Urology [facility] [employer]  The [facility]rology Offices: [address] [facility], [address] [place] [address], [facility] [address] [facility], [place] [address]  TEL: [phone] FAX: [phone]

## 2024-09-30 NOTE — HISTORY OF PRESENT ILLNESS
[FreeTextEntry1] : Primary care physician: Dr Minda Viramontes [facility].   09/27/2024: 24-year-old female presents for telemedicine visit.   Since last visit, has had a few episodes where she felt that she had infection and also has had flank pain.    Patient called us and had a urine test and RBUS.    Is using Nitrofurantoin for post-coital prophylaxis and has not had another infection.    Did not do any repeat 24-hour urine test.    Patient denies any constipation.    03/29/24: 23-year-old female presents for follow up.  24-hour urine completed. Showed low citrate 189 and urine volume 1300 ml. Stone analysis showed 100% Calcium oxalate monohydrate. Recent renal and bladder ultrasound showed 4 mm left kidney stone which is not seen on X ray of Kidney, Ureter and Bladder.  Denies any difficulty with urination. Daytime frequency is 2-3 times and no nocturia. Denies dysuria, hematuria, lower abdominal or flank pain, fever, chills or rigors. No new urinary tract infection.    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 Has had 4 recurrent urinary tract infections in last year or so, possibly after sexual activity.  Status post left ureteroscopy and stone extraction (two) from the bladder, after removal of ureteral stent on 1/5/2024 at [facility].   Status post cystoscopy and left ureteral stent placement on 12/18/2023 at [facility] for 3 mm obstructing left distal ureteral stone. No prior personal history of kidney stone.  Father has history of kidney stone.

## 2024-09-30 NOTE — END OF VISIT
[Time Spent: ___ minutes] : I have spent [unfilled] minutes of time on the encounter which excludes teaching and separately reported services. [FreeTextEntry3] : Patient was seen with Nurse Practitioner and examined by me. Agree with above note, where necessary edits were made.

## 2024-09-30 NOTE — PHYSICAL EXAM
[Normal Appearance] : normal appearance [Well Groomed] : well groomed [General Appearance - In No Acute Distress] : no acute distress [Edema] : no peripheral edema [] : no respiratory distress [Respiration, Rhythm And Depth] : normal respiratory rhythm and effort [Exaggerated Use Of Accessory Muscles For Inspiration] : no accessory muscle use [Costovertebral Angle Tenderness] : no ~M costovertebral angle tenderness [Normal Station and Gait] : the gait and station were normal for the patient's age [Oriented To Time, Place, And Person] : oriented to person, place, and time [Affect] : the affect was normal [Mood] : the mood was normal [de-identified] : normal peripheral circulation

## 2024-09-30 NOTE — HISTORY OF PRESENT ILLNESS
[FreeTextEntry1] : Primary care physician: Dr Minda Viramontes [facility].   09/27/2024: 24-year-old female presents for telemedicine visit.   Since last visit, has had a few episodes where she felt that she had infection and also has had flank pain.    Patient called us and had a urine test and RBUS.    Is using Nitrofurantoin for post-coital prophylaxis and has not had another infection.    Did not do any repeat 24-hour urine test.    Patient denies any constipation.    03/29/24: 23-year-old female presents for follow up.  24-hour urine completed. Showed low citrate 189 and urine volume 1300 ml. Stone analysis showed 100% Calcium oxalate monohydrate. Recent renal and bladder ultrasound showed 4 mm left kidney stone which is not seen on X ray of Kidney, Ureter and Bladder.  Denies any difficulty with urination. Daytime frequency is 2-3 times and no nocturia. Denies dysuria, hematuria, lower abdominal or flank pain, fever, chills or rigors. No new urinary tract infection.    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 Has had 4 recurrent urinary tract infections in last year or so, possibly after sexual activity.  Status post left ureteroscopy and stone extraction (two) from the bladder, after removal of ureteral stent on 1/5/2024 at [facility].   Status post cystoscopy and left ureteral stent placement on 12/18/2023 at [facility] for 3 mm obstructing left distal ureteral stone. No prior personal history of kidney stone.  Father has history of kidney stone.

## 2024-10-02 ENCOUNTER — TRANSCRIPTION ENCOUNTER (OUTPATIENT)
Age: 24
End: 2024-10-02

## 2024-10-02 LAB
CALPROTECTIN FECAL: 98 UG/G
CDIFF BY PCR: NOT DETECTED
CELIAC DISEASE INTERPRETATION: NORMAL
CELIAC GENE PAIRS PRESENT: NO
DQ ALPHA 1: NORMAL
DQ BETA 1: NORMAL
GI PCR PANEL: NOT DETECTED
IMMUNOGLOBULIN A (IGA): 132 MG/DL

## 2024-10-04 ENCOUNTER — TRANSCRIPTION ENCOUNTER (OUTPATIENT)
Age: 24
End: 2024-10-04

## 2024-10-05 ENCOUNTER — TRANSCRIPTION ENCOUNTER (OUTPATIENT)
Age: 24
End: 2024-10-05

## 2024-10-06 PROBLEM — R10.9 FLANK PAIN: Status: ACTIVE | Noted: 2024-10-06

## 2024-10-06 PROBLEM — Z87.442 HISTORY OF KIDNEY STONES: Status: ACTIVE | Noted: 2024-10-06

## 2024-10-06 PROBLEM — R82.991 HYPOCITRATURIA: Status: ACTIVE | Noted: 2024-10-06

## 2024-11-13 ENCOUNTER — TRANSCRIPTION ENCOUNTER (OUTPATIENT)
Age: 24
End: 2024-11-13

## 2024-11-13 LAB
APPEARANCE: CLEAR
BACTERIA UR CULT: NORMAL
BACTERIA: NEGATIVE /HPF
BILIRUBIN URINE: NEGATIVE
BLOOD URINE: NEGATIVE
CAST: 1 /LPF
COLOR: YELLOW
EPITHELIAL CELLS: 2 /HPF
GLUCOSE QUALITATIVE U: NEGATIVE MG/DL
KETONES URINE: NEGATIVE MG/DL
LEUKOCYTE ESTERASE URINE: ABNORMAL
MICROSCOPIC-UA: NORMAL
NITRITE URINE: NEGATIVE
PH URINE: 5.5
PROTEIN URINE: NEGATIVE MG/DL
RED BLOOD CELLS URINE: 3 /HPF
SPECIFIC GRAVITY URINE: 1.02
UROBILINOGEN URINE: 0.2 MG/DL
WHITE BLOOD CELLS URINE: 1 /HPF

## 2024-11-30 NOTE — ASU PREOP CHECKLIST - VERIFY SURGICAL SITE/SIDE WITH PATIENT
Addended by: NIDHI JOHNSON on: 11/30/2024 10:06 AM     Modules accepted: Orders    
Addended by: NOMI CARDOSO on: 11/30/2024 09:48 AM     Modules accepted: Orders    
done

## 2024-12-07 ENCOUNTER — OUTPATIENT (OUTPATIENT)
Dept: OUTPATIENT SERVICES | Facility: HOSPITAL | Age: 24
LOS: 1 days | End: 2024-12-07
Payer: COMMERCIAL

## 2024-12-07 ENCOUNTER — APPOINTMENT (OUTPATIENT)
Dept: ULTRASOUND IMAGING | Facility: CLINIC | Age: 24
End: 2024-12-07
Payer: COMMERCIAL

## 2024-12-07 ENCOUNTER — APPOINTMENT (OUTPATIENT)
Dept: RADIOLOGY | Facility: CLINIC | Age: 24
End: 2024-12-07
Payer: COMMERCIAL

## 2024-12-07 DIAGNOSIS — Z87.442 PERSONAL HISTORY OF URINARY CALCULI: ICD-10-CM

## 2024-12-07 DIAGNOSIS — K08.409 PARTIAL LOSS OF TEETH, UNSPECIFIED CAUSE, UNSPECIFIED CLASS: Chronic | ICD-10-CM

## 2024-12-07 DIAGNOSIS — T83.122A DISPLACEMENT OF INDWELLING URETERAL STENT, INITIAL ENCOUNTER: Chronic | ICD-10-CM

## 2024-12-07 DIAGNOSIS — R39.0 EXTRAVASATION OF URINE: ICD-10-CM

## 2024-12-07 PROCEDURE — 74018 RADEX ABDOMEN 1 VIEW: CPT

## 2024-12-07 PROCEDURE — 76770 US EXAM ABDO BACK WALL COMP: CPT | Mod: 26

## 2024-12-07 PROCEDURE — 74018 RADEX ABDOMEN 1 VIEW: CPT | Mod: 26

## 2024-12-07 PROCEDURE — 76770 US EXAM ABDO BACK WALL COMP: CPT

## 2024-12-10 ENCOUNTER — TRANSCRIPTION ENCOUNTER (OUTPATIENT)
Age: 24
End: 2024-12-10

## 2025-06-17 ENCOUNTER — TRANSCRIPTION ENCOUNTER (OUTPATIENT)
Age: 25
End: 2025-06-17

## 2025-06-17 LAB
APPEARANCE: CLEAR
BACTERIA UR CULT: NORMAL
BACTERIA: NEGATIVE /HPF
BILIRUBIN URINE: NEGATIVE
BLOOD URINE: NEGATIVE
CAST: 0 /LPF
COLOR: YELLOW
EPITHELIAL CELLS: 0 /HPF
GLUCOSE QUALITATIVE U: NEGATIVE MG/DL
KETONES URINE: NEGATIVE MG/DL
LEUKOCYTE ESTERASE URINE: NEGATIVE
MICROSCOPIC-UA: NORMAL
NITRITE URINE: NEGATIVE
PH URINE: 6.5
PROTEIN URINE: NEGATIVE MG/DL
RED BLOOD CELLS URINE: 0 /HPF
SPECIFIC GRAVITY URINE: 1.01
UROBILINOGEN URINE: 0.2 MG/DL
WHITE BLOOD CELLS URINE: 0 /HPF

## 2025-06-17 RX ORDER — CEFUROXIME AXETIL 500 MG/1
500 TABLET, FILM COATED ORAL DAILY
Qty: 15 | Refills: 1 | Status: ACTIVE | COMMUNITY
Start: 2025-06-17 | End: 1900-01-01